# Patient Record
Sex: MALE | Race: BLACK OR AFRICAN AMERICAN | NOT HISPANIC OR LATINO | URBAN - METROPOLITAN AREA
[De-identification: names, ages, dates, MRNs, and addresses within clinical notes are randomized per-mention and may not be internally consistent; named-entity substitution may affect disease eponyms.]

---

## 2020-01-01 ENCOUNTER — HOSPITAL ENCOUNTER (INPATIENT)
Facility: HOSPITAL | Age: 85
LOS: 3 days | DRG: 314 | End: 2020-09-25
Attending: EMERGENCY MEDICINE | Admitting: INTERNAL MEDICINE
Payer: MEDICARE

## 2020-01-01 ENCOUNTER — LAB VISIT (OUTPATIENT)
Dept: LAB | Facility: OTHER | Age: 85
End: 2020-01-01
Payer: MEDICARE

## 2020-01-01 VITALS
HEIGHT: 68 IN | SYSTOLIC BLOOD PRESSURE: 97 MMHG | OXYGEN SATURATION: 92 % | TEMPERATURE: 97 F | BODY MASS INDEX: 19.08 KG/M2 | DIASTOLIC BLOOD PRESSURE: 55 MMHG | WEIGHT: 125.88 LBS | RESPIRATION RATE: 5 BRPM | HEART RATE: 63 BPM

## 2020-01-01 DIAGNOSIS — Z51.5 PALLIATIVE CARE ENCOUNTER: ICD-10-CM

## 2020-01-01 DIAGNOSIS — R57.9 SHOCK: ICD-10-CM

## 2020-01-01 DIAGNOSIS — Z03.818 ENCOUNTER FOR OBSERVATION FOR SUSPECTED EXPOSURE TO OTHER BIOLOGICAL AGENTS RULED OUT: ICD-10-CM

## 2020-01-01 DIAGNOSIS — R65.21 SEPTIC SHOCK: ICD-10-CM

## 2020-01-01 DIAGNOSIS — A41.9 SEPSIS: ICD-10-CM

## 2020-01-01 DIAGNOSIS — R56.9 SEIZURES COMPLICATING INFECTION: Primary | ICD-10-CM

## 2020-01-01 DIAGNOSIS — A41.9 SEPTIC SHOCK: ICD-10-CM

## 2020-01-01 DIAGNOSIS — B99.9 SEIZURES COMPLICATING INFECTION: Primary | ICD-10-CM

## 2020-01-01 DIAGNOSIS — Z71.89 GOALS OF CARE, COUNSELING/DISCUSSION: ICD-10-CM

## 2020-01-01 DIAGNOSIS — Z71.89 ADVANCED CARE PLANNING/COUNSELING DISCUSSION: ICD-10-CM

## 2020-01-01 LAB
ALBUMIN SERPL BCP-MCNC: 1.5 G/DL (ref 3.5–5.2)
ALBUMIN SERPL BCP-MCNC: 1.7 G/DL (ref 3.5–5.2)
ALBUMIN SERPL BCP-MCNC: 1.7 G/DL (ref 3.5–5.2)
ALBUMIN SERPL BCP-MCNC: 1.8 G/DL (ref 3.5–5.2)
ALLENS TEST: ABNORMAL
ALP SERPL-CCNC: 70 U/L (ref 55–135)
ALP SERPL-CCNC: 74 U/L (ref 55–135)
ALP SERPL-CCNC: 77 U/L (ref 55–135)
ALP SERPL-CCNC: 87 U/L (ref 55–135)
ALT SERPL W/O P-5'-P-CCNC: <5 U/L (ref 10–44)
ANION GAP SERPL CALC-SCNC: 11 MMOL/L (ref 8–16)
ANION GAP SERPL CALC-SCNC: 12 MMOL/L (ref 8–16)
ANION GAP SERPL CALC-SCNC: 12 MMOL/L (ref 8–16)
ANION GAP SERPL CALC-SCNC: 15 MMOL/L (ref 8–16)
ANION GAP SERPL CALC-SCNC: 8 MMOL/L (ref 8–16)
AST SERPL-CCNC: 15 U/L (ref 10–40)
AST SERPL-CCNC: 17 U/L (ref 10–40)
AST SERPL-CCNC: 17 U/L (ref 10–40)
AST SERPL-CCNC: 26 U/L (ref 10–40)
BACTERIA #/AREA URNS AUTO: ABNORMAL /HPF
BASOPHILS # BLD AUTO: 0.01 K/UL (ref 0–0.2)
BASOPHILS # BLD AUTO: 0.01 K/UL (ref 0–0.2)
BASOPHILS # BLD AUTO: 0.02 K/UL (ref 0–0.2)
BASOPHILS # BLD AUTO: 0.02 K/UL (ref 0–0.2)
BASOPHILS NFR BLD: 0.1 % (ref 0–1.9)
BASOPHILS NFR BLD: 0.1 % (ref 0–1.9)
BASOPHILS NFR BLD: 0.2 % (ref 0–1.9)
BASOPHILS NFR BLD: 0.4 % (ref 0–1.9)
BILIRUB SERPL-MCNC: 0.7 MG/DL (ref 0.1–1)
BILIRUB SERPL-MCNC: 0.8 MG/DL (ref 0.1–1)
BILIRUB SERPL-MCNC: 1 MG/DL (ref 0.1–1)
BILIRUB SERPL-MCNC: 1 MG/DL (ref 0.1–1)
BILIRUB UR QL STRIP: NEGATIVE
BUN SERPL-MCNC: 30 MG/DL (ref 8–23)
BUN SERPL-MCNC: 32 MG/DL (ref 8–23)
BUN SERPL-MCNC: 33 MG/DL (ref 8–23)
BUN SERPL-MCNC: 33 MG/DL (ref 8–23)
BUN SERPL-MCNC: 37 MG/DL (ref 8–23)
CALCIUM SERPL-MCNC: 8 MG/DL (ref 8.7–10.5)
CALCIUM SERPL-MCNC: 8.4 MG/DL (ref 8.7–10.5)
CALCIUM SERPL-MCNC: 8.5 MG/DL (ref 8.7–10.5)
CALCIUM SERPL-MCNC: 8.7 MG/DL (ref 8.7–10.5)
CALCIUM SERPL-MCNC: 8.8 MG/DL (ref 8.7–10.5)
CHLORIDE SERPL-SCNC: 110 MMOL/L (ref 95–110)
CHLORIDE SERPL-SCNC: 112 MMOL/L (ref 95–110)
CHLORIDE SERPL-SCNC: 113 MMOL/L (ref 95–110)
CLARITY UR REFRACT.AUTO: ABNORMAL
CO2 SERPL-SCNC: 17 MMOL/L (ref 23–29)
CO2 SERPL-SCNC: 18 MMOL/L (ref 23–29)
CO2 SERPL-SCNC: 18 MMOL/L (ref 23–29)
CO2 SERPL-SCNC: 20 MMOL/L (ref 23–29)
CO2 SERPL-SCNC: 23 MMOL/L (ref 23–29)
COLOR UR AUTO: YELLOW
CREAT SERPL-MCNC: 2 MG/DL (ref 0.5–1.4)
CREAT SERPL-MCNC: 2 MG/DL (ref 0.5–1.4)
CREAT SERPL-MCNC: 2.3 MG/DL (ref 0.5–1.4)
CREAT SERPL-MCNC: 2.4 MG/DL (ref 0.5–1.4)
CREAT SERPL-MCNC: 2.5 MG/DL (ref 0.5–1.4)
CTP QC/QA: YES
DELSYS: ABNORMAL
DIFFERENTIAL METHOD: ABNORMAL
EOSINOPHIL # BLD AUTO: 0.1 K/UL (ref 0–0.5)
EOSINOPHIL # BLD AUTO: 0.1 K/UL (ref 0–0.5)
EOSINOPHIL # BLD AUTO: 0.3 K/UL (ref 0–0.5)
EOSINOPHIL # BLD AUTO: 0.3 K/UL (ref 0–0.5)
EOSINOPHIL NFR BLD: 0.9 % (ref 0–8)
EOSINOPHIL NFR BLD: 1.3 % (ref 0–8)
EOSINOPHIL NFR BLD: 3.2 % (ref 0–8)
EOSINOPHIL NFR BLD: 5.8 % (ref 0–8)
ERYTHROCYTE [DISTWIDTH] IN BLOOD BY AUTOMATED COUNT: 14.6 % (ref 11.5–14.5)
ERYTHROCYTE [DISTWIDTH] IN BLOOD BY AUTOMATED COUNT: 14.7 % (ref 11.5–14.5)
ERYTHROCYTE [DISTWIDTH] IN BLOOD BY AUTOMATED COUNT: 14.7 % (ref 11.5–14.5)
ERYTHROCYTE [DISTWIDTH] IN BLOOD BY AUTOMATED COUNT: 14.9 % (ref 11.5–14.5)
EST. GFR  (AFRICAN AMERICAN): 26.3 ML/MIN/1.73 M^2
EST. GFR  (AFRICAN AMERICAN): 27.6 ML/MIN/1.73 M^2
EST. GFR  (AFRICAN AMERICAN): 29.1 ML/MIN/1.73 M^2
EST. GFR  (AFRICAN AMERICAN): 34.4 ML/MIN/1.73 M^2
EST. GFR  (AFRICAN AMERICAN): 34.4 ML/MIN/1.73 M^2
EST. GFR  (NON AFRICAN AMERICAN): 22.7 ML/MIN/1.73 M^2
EST. GFR  (NON AFRICAN AMERICAN): 23.9 ML/MIN/1.73 M^2
EST. GFR  (NON AFRICAN AMERICAN): 25.1 ML/MIN/1.73 M^2
EST. GFR  (NON AFRICAN AMERICAN): 29.8 ML/MIN/1.73 M^2
EST. GFR  (NON AFRICAN AMERICAN): 29.8 ML/MIN/1.73 M^2
ESTIMATED AVG GLUCOSE: 85 MG/DL (ref 68–131)
GLUCOSE SERPL-MCNC: 103 MG/DL (ref 70–110)
GLUCOSE SERPL-MCNC: 229 MG/DL (ref 70–110)
GLUCOSE SERPL-MCNC: 273 MG/DL (ref 70–110)
GLUCOSE SERPL-MCNC: 68 MG/DL (ref 70–110)
GLUCOSE SERPL-MCNC: 88 MG/DL (ref 70–110)
GLUCOSE UR QL STRIP: NEGATIVE
HBA1C MFR BLD HPLC: 4.6 % (ref 4–5.6)
HCO3 UR-SCNC: 14.1 MMOL/L (ref 24–28)
HCT VFR BLD AUTO: 25.4 % (ref 40–54)
HCT VFR BLD AUTO: 26.3 % (ref 40–54)
HCT VFR BLD AUTO: 26.8 % (ref 40–54)
HCT VFR BLD AUTO: 31.5 % (ref 40–54)
HGB BLD-MCNC: 7.9 G/DL (ref 14–18)
HGB BLD-MCNC: 8.2 G/DL (ref 14–18)
HGB BLD-MCNC: 8.3 G/DL (ref 14–18)
HGB BLD-MCNC: 9.9 G/DL (ref 14–18)
HGB UR QL STRIP: ABNORMAL
HYALINE CASTS UR QL AUTO: 1 /LPF
IMM GRANULOCYTES # BLD AUTO: 0.02 K/UL (ref 0–0.04)
IMM GRANULOCYTES # BLD AUTO: 0.03 K/UL (ref 0–0.04)
IMM GRANULOCYTES # BLD AUTO: 0.04 K/UL (ref 0–0.04)
IMM GRANULOCYTES # BLD AUTO: 0.04 K/UL (ref 0–0.04)
IMM GRANULOCYTES NFR BLD AUTO: 0.3 % (ref 0–0.5)
IMM GRANULOCYTES NFR BLD AUTO: 0.3 % (ref 0–0.5)
IMM GRANULOCYTES NFR BLD AUTO: 0.4 % (ref 0–0.5)
IMM GRANULOCYTES NFR BLD AUTO: 0.5 % (ref 0–0.5)
KETONES UR QL STRIP: ABNORMAL
LACTATE SERPL-SCNC: 0.8 MMOL/L (ref 0.5–2.2)
LACTATE SERPL-SCNC: 1.1 MMOL/L (ref 0.5–2.2)
LACTATE SERPL-SCNC: 3.7 MMOL/L (ref 0.5–2.2)
LEUKOCYTE ESTERASE UR QL STRIP: NEGATIVE
LYMPHOCYTES # BLD AUTO: 0.4 K/UL (ref 1–4.8)
LYMPHOCYTES # BLD AUTO: 0.6 K/UL (ref 1–4.8)
LYMPHOCYTES # BLD AUTO: 0.7 K/UL (ref 1–4.8)
LYMPHOCYTES # BLD AUTO: 0.7 K/UL (ref 1–4.8)
LYMPHOCYTES NFR BLD: 12.5 % (ref 18–48)
LYMPHOCYTES NFR BLD: 3.1 % (ref 18–48)
LYMPHOCYTES NFR BLD: 6.4 % (ref 18–48)
LYMPHOCYTES NFR BLD: 8.4 % (ref 18–48)
MAGNESIUM SERPL-MCNC: 1.9 MG/DL (ref 1.6–2.6)
MAGNESIUM SERPL-MCNC: 2 MG/DL (ref 1.6–2.6)
MAGNESIUM SERPL-MCNC: 2.1 MG/DL (ref 1.6–2.6)
MCH RBC QN AUTO: 31.9 PG (ref 27–31)
MCH RBC QN AUTO: 32 PG (ref 27–31)
MCH RBC QN AUTO: 32.5 PG (ref 27–31)
MCH RBC QN AUTO: 32.7 PG (ref 27–31)
MCHC RBC AUTO-ENTMCNC: 30.6 G/DL (ref 32–36)
MCHC RBC AUTO-ENTMCNC: 31.1 G/DL (ref 32–36)
MCHC RBC AUTO-ENTMCNC: 31.4 G/DL (ref 32–36)
MCHC RBC AUTO-ENTMCNC: 31.6 G/DL (ref 32–36)
MCV RBC AUTO: 102 FL (ref 82–98)
MCV RBC AUTO: 104 FL (ref 82–98)
MCV RBC AUTO: 104 FL (ref 82–98)
MCV RBC AUTO: 105 FL (ref 82–98)
MICROSCOPIC COMMENT: ABNORMAL
MONOCYTES # BLD AUTO: 0.3 K/UL (ref 0.3–1)
MONOCYTES # BLD AUTO: 0.4 K/UL (ref 0.3–1)
MONOCYTES # BLD AUTO: 0.5 K/UL (ref 0.3–1)
MONOCYTES # BLD AUTO: 0.5 K/UL (ref 0.3–1)
MONOCYTES NFR BLD: 3.1 % (ref 4–15)
MONOCYTES NFR BLD: 3.8 % (ref 4–15)
MONOCYTES NFR BLD: 6.5 % (ref 4–15)
MONOCYTES NFR BLD: 6.5 % (ref 4–15)
NEUTROPHILS # BLD AUTO: 11.4 K/UL (ref 1.8–7.7)
NEUTROPHILS # BLD AUTO: 4.1 K/UL (ref 1.8–7.7)
NEUTROPHILS # BLD AUTO: 6.4 K/UL (ref 1.8–7.7)
NEUTROPHILS # BLD AUTO: 7.6 K/UL (ref 1.8–7.7)
NEUTROPHILS NFR BLD: 74.4 % (ref 38–73)
NEUTROPHILS NFR BLD: 81.3 % (ref 38–73)
NEUTROPHILS NFR BLD: 88.8 % (ref 38–73)
NEUTROPHILS NFR BLD: 91.7 % (ref 38–73)
NITRITE UR QL STRIP: NEGATIVE
NRBC BLD-RTO: 0 /100 WBC
PCO2 BLDA: 22.3 MMHG (ref 35–45)
PH SMN: 7.41 [PH] (ref 7.35–7.45)
PH UR STRIP: 5 [PH] (ref 5–8)
PHOSPHATE SERPL-MCNC: 3.1 MG/DL (ref 2.7–4.5)
PLATELET # BLD AUTO: 217 K/UL (ref 150–350)
PLATELET # BLD AUTO: 265 K/UL (ref 150–350)
PLATELET # BLD AUTO: 277 K/UL (ref 150–350)
PLATELET # BLD AUTO: 364 K/UL (ref 150–350)
PMV BLD AUTO: 10 FL (ref 9.2–12.9)
PMV BLD AUTO: 10.1 FL (ref 9.2–12.9)
PMV BLD AUTO: 10.3 FL (ref 9.2–12.9)
PMV BLD AUTO: 10.3 FL (ref 9.2–12.9)
PO2 BLDA: 93 MMHG (ref 80–100)
POC BE: -11 MMOL/L
POC SATURATED O2: 98 % (ref 95–100)
POC TCO2: 15 MMOL/L (ref 23–27)
POCT GLUCOSE: 105 MG/DL (ref 70–110)
POCT GLUCOSE: 123 MG/DL (ref 70–110)
POCT GLUCOSE: 133 MG/DL (ref 70–110)
POCT GLUCOSE: 155 MG/DL (ref 70–110)
POCT GLUCOSE: 86 MG/DL (ref 70–110)
POCT GLUCOSE: 93 MG/DL (ref 70–110)
POCT GLUCOSE: 97 MG/DL (ref 70–110)
POTASSIUM SERPL-SCNC: 2.4 MMOL/L (ref 3.5–5.1)
POTASSIUM SERPL-SCNC: 2.9 MMOL/L (ref 3.5–5.1)
POTASSIUM SERPL-SCNC: 3.1 MMOL/L (ref 3.5–5.1)
POTASSIUM SERPL-SCNC: 3.2 MMOL/L (ref 3.5–5.1)
POTASSIUM SERPL-SCNC: 3.6 MMOL/L (ref 3.5–5.1)
POTASSIUM SERPL-SCNC: 3.6 MMOL/L (ref 3.5–5.1)
PROT SERPL-MCNC: 6.3 G/DL (ref 6–8.4)
PROT SERPL-MCNC: 6.3 G/DL (ref 6–8.4)
PROT SERPL-MCNC: 6.5 G/DL (ref 6–8.4)
PROT SERPL-MCNC: 7 G/DL (ref 6–8.4)
PROT UR QL STRIP: ABNORMAL
RBC # BLD AUTO: 2.43 M/UL (ref 4.6–6.2)
RBC # BLD AUTO: 2.54 M/UL (ref 4.6–6.2)
RBC # BLD AUTO: 2.57 M/UL (ref 4.6–6.2)
RBC # BLD AUTO: 3.09 M/UL (ref 4.6–6.2)
RBC #/AREA URNS AUTO: 8 /HPF (ref 0–4)
SAMPLE: ABNORMAL
SARS-COV-2 RDRP RESP QL NAA+PROBE: NEGATIVE
SARS-COV-2 RNA RESP QL NAA+PROBE: NOT DETECTED
SITE: ABNORMAL
SODIUM SERPL-SCNC: 139 MMOL/L (ref 136–145)
SODIUM SERPL-SCNC: 139 MMOL/L (ref 136–145)
SODIUM SERPL-SCNC: 140 MMOL/L (ref 136–145)
SODIUM SERPL-SCNC: 143 MMOL/L (ref 136–145)
SODIUM SERPL-SCNC: 148 MMOL/L (ref 136–145)
SP GR UR STRIP: 1.01 (ref 1–1.03)
T4 FREE SERPL-MCNC: 0.92 NG/DL (ref 0.71–1.51)
TROPONIN I SERPL DL<=0.01 NG/ML-MCNC: 0.03 NG/ML (ref 0–0.03)
TROPONIN I SERPL DL<=0.01 NG/ML-MCNC: 0.04 NG/ML (ref 0–0.03)
TROPONIN I SERPL DL<=0.01 NG/ML-MCNC: 0.05 NG/ML (ref 0–0.03)
TROPONIN I SERPL DL<=0.01 NG/ML-MCNC: 0.07 NG/ML (ref 0–0.03)
TROPONIN I SERPL DL<=0.01 NG/ML-MCNC: 0.1 NG/ML (ref 0–0.03)
TROPONIN I SERPL DL<=0.01 NG/ML-MCNC: 0.12 NG/ML (ref 0–0.03)
TROPONIN I SERPL DL<=0.01 NG/ML-MCNC: 0.19 NG/ML (ref 0–0.03)
TROPONIN I SERPL DL<=0.01 NG/ML-MCNC: 0.2 NG/ML (ref 0–0.03)
TROPONIN I SERPL DL<=0.01 NG/ML-MCNC: 0.2 NG/ML (ref 0–0.03)
TSH SERPL DL<=0.005 MIU/L-ACNC: 4.68 UIU/ML (ref 0.4–4)
URN SPEC COLLECT METH UR: ABNORMAL
VANCOMYCIN SERPL-MCNC: 16.8 UG/ML
VANCOMYCIN SERPL-MCNC: 22.7 UG/ML
VANCOMYCIN SERPL-MCNC: 4.6 UG/ML
WBC # BLD AUTO: 12.39 K/UL (ref 3.9–12.7)
WBC # BLD AUTO: 5.53 K/UL (ref 3.9–12.7)
WBC # BLD AUTO: 7.85 K/UL (ref 3.9–12.7)
WBC # BLD AUTO: 8.59 K/UL (ref 3.9–12.7)
WBC #/AREA URNS AUTO: 2 /HPF (ref 0–5)

## 2020-01-01 PROCEDURE — 36620 INSERTION CATHETER ARTERY: CPT

## 2020-01-01 PROCEDURE — 84484 ASSAY OF TROPONIN QUANT: CPT | Mod: 91

## 2020-01-01 PROCEDURE — 99291 PR CRITICAL CARE, E/M 30-74 MINUTES: ICD-10-PCS | Mod: ,,, | Performed by: INTERNAL MEDICINE

## 2020-01-01 PROCEDURE — 80053 COMPREHEN METABOLIC PANEL: CPT

## 2020-01-01 PROCEDURE — 99291 CRITICAL CARE FIRST HOUR: CPT | Mod: 25,,, | Performed by: EMERGENCY MEDICINE

## 2020-01-01 PROCEDURE — 25000003 PHARM REV CODE 250: Performed by: STUDENT IN AN ORGANIZED HEALTH CARE EDUCATION/TRAINING PROGRAM

## 2020-01-01 PROCEDURE — 80048 BASIC METABOLIC PNL TOTAL CA: CPT

## 2020-01-01 PROCEDURE — 99223 PR INITIAL HOSPITAL CARE,LEVL III: ICD-10-PCS | Mod: ,,, | Performed by: CLINICAL NURSE SPECIALIST

## 2020-01-01 PROCEDURE — 80202 ASSAY OF VANCOMYCIN: CPT

## 2020-01-01 PROCEDURE — 87040 BLOOD CULTURE FOR BACTERIA: CPT

## 2020-01-01 PROCEDURE — 51702 INSERT TEMP BLADDER CATH: CPT

## 2020-01-01 PROCEDURE — 99497 ADVNCD CARE PLAN 30 MIN: CPT | Mod: 25,,, | Performed by: CLINICAL NURSE SPECIALIST

## 2020-01-01 PROCEDURE — 63600175 PHARM REV CODE 636 W HCPCS: Performed by: STUDENT IN AN ORGANIZED HEALTH CARE EDUCATION/TRAINING PROGRAM

## 2020-01-01 PROCEDURE — 84132 ASSAY OF SERUM POTASSIUM: CPT

## 2020-01-01 PROCEDURE — 84484 ASSAY OF TROPONIN QUANT: CPT

## 2020-01-01 PROCEDURE — 37799 UNLISTED PX VASCULAR SURGERY: CPT

## 2020-01-01 PROCEDURE — 99291 CRITICAL CARE FIRST HOUR: CPT | Mod: 25

## 2020-01-01 PROCEDURE — 94761 N-INVAS EAR/PLS OXIMETRY MLT: CPT

## 2020-01-01 PROCEDURE — 25000003 PHARM REV CODE 250: Performed by: INTERNAL MEDICINE

## 2020-01-01 PROCEDURE — 51701 INSERT BLADDER CATHETER: CPT

## 2020-01-01 PROCEDURE — 96361 HYDRATE IV INFUSION ADD-ON: CPT

## 2020-01-01 PROCEDURE — 83036 HEMOGLOBIN GLYCOSYLATED A1C: CPT

## 2020-01-01 PROCEDURE — 84443 ASSAY THYROID STIM HORMONE: CPT

## 2020-01-01 PROCEDURE — 20000000 HC ICU ROOM

## 2020-01-01 PROCEDURE — 96375 TX/PRO/DX INJ NEW DRUG ADDON: CPT

## 2020-01-01 PROCEDURE — 87186 SC STD MICRODIL/AGAR DIL: CPT

## 2020-01-01 PROCEDURE — 81001 URINALYSIS AUTO W/SCOPE: CPT

## 2020-01-01 PROCEDURE — 87077 CULTURE AEROBIC IDENTIFY: CPT

## 2020-01-01 PROCEDURE — 63600175 PHARM REV CODE 636 W HCPCS: Performed by: EMERGENCY MEDICINE

## 2020-01-01 PROCEDURE — 99233 PR SUBSEQUENT HOSPITAL CARE,LEVL III: ICD-10-PCS | Mod: ,,, | Performed by: INTERNAL MEDICINE

## 2020-01-01 PROCEDURE — 96365 THER/PROPH/DIAG IV INF INIT: CPT

## 2020-01-01 PROCEDURE — 99291 CRITICAL CARE FIRST HOUR: CPT | Mod: ,,, | Performed by: INTERNAL MEDICINE

## 2020-01-01 PROCEDURE — 87186 SC STD MICRODIL/AGAR DIL: CPT | Mod: 91

## 2020-01-01 PROCEDURE — 84100 ASSAY OF PHOSPHORUS: CPT

## 2020-01-01 PROCEDURE — 63600175 PHARM REV CODE 636 W HCPCS

## 2020-01-01 PROCEDURE — 99233 SBSQ HOSP IP/OBS HIGH 50: CPT | Mod: ,,, | Performed by: INTERNAL MEDICINE

## 2020-01-01 PROCEDURE — 63600175 PHARM REV CODE 636 W HCPCS: Performed by: INTERNAL MEDICINE

## 2020-01-01 PROCEDURE — S0166 INJ OLANZAPINE 2.5MG: HCPCS | Performed by: STUDENT IN AN ORGANIZED HEALTH CARE EDUCATION/TRAINING PROGRAM

## 2020-01-01 PROCEDURE — 99900035 HC TECH TIME PER 15 MIN (STAT)

## 2020-01-01 PROCEDURE — 83735 ASSAY OF MAGNESIUM: CPT

## 2020-01-01 PROCEDURE — 93010 ELECTROCARDIOGRAM REPORT: CPT | Mod: ,,, | Performed by: INTERNAL MEDICINE

## 2020-01-01 PROCEDURE — 96366 THER/PROPH/DIAG IV INF ADDON: CPT

## 2020-01-01 PROCEDURE — 99223 1ST HOSP IP/OBS HIGH 75: CPT | Mod: ,,, | Performed by: CLINICAL NURSE SPECIALIST

## 2020-01-01 PROCEDURE — 63600175 PHARM REV CODE 636 W HCPCS: Performed by: PHYSICIAN ASSISTANT

## 2020-01-01 PROCEDURE — 96374 THER/PROPH/DIAG INJ IV PUSH: CPT | Mod: 59

## 2020-01-01 PROCEDURE — U0002 COVID-19 LAB TEST NON-CDC: HCPCS | Performed by: EMERGENCY MEDICINE

## 2020-01-01 PROCEDURE — 84439 ASSAY OF FREE THYROXINE: CPT

## 2020-01-01 PROCEDURE — 36556 INSERT NON-TUNNEL CV CATH: CPT

## 2020-01-01 PROCEDURE — 99292 PR CRITICAL CARE, ADDL 30 MIN: ICD-10-PCS | Mod: ,,, | Performed by: EMERGENCY MEDICINE

## 2020-01-01 PROCEDURE — 83605 ASSAY OF LACTIC ACID: CPT | Mod: 91

## 2020-01-01 PROCEDURE — 85025 COMPLETE CBC W/AUTO DIFF WBC: CPT

## 2020-01-01 PROCEDURE — 93005 ELECTROCARDIOGRAM TRACING: CPT

## 2020-01-01 PROCEDURE — U0003 INFECTIOUS AGENT DETECTION BY NUCLEIC ACID (DNA OR RNA); SEVERE ACUTE RESPIRATORY SYNDROME CORONAVIRUS 2 (SARS-COV-2) (CORONAVIRUS DISEASE [COVID-19]), AMPLIFIED PROBE TECHNIQUE, MAKING USE OF HIGH THROUGHPUT TECHNOLOGIES AS DESCRIBED BY CMS-2020-01-R: HCPCS

## 2020-01-01 PROCEDURE — 83605 ASSAY OF LACTIC ACID: CPT

## 2020-01-01 PROCEDURE — 25000003 PHARM REV CODE 250

## 2020-01-01 PROCEDURE — 82803 BLOOD GASES ANY COMBINATION: CPT

## 2020-01-01 PROCEDURE — 25000003 PHARM REV CODE 250: Performed by: EMERGENCY MEDICINE

## 2020-01-01 PROCEDURE — 87106 FUNGI IDENTIFICATION YEAST: CPT

## 2020-01-01 PROCEDURE — 27200188 HC TRANSDUCER, ART ADULT/PEDS

## 2020-01-01 PROCEDURE — 93010 EKG 12-LEAD: ICD-10-PCS | Mod: ,,, | Performed by: INTERNAL MEDICINE

## 2020-01-01 PROCEDURE — 99292 CRITICAL CARE ADDL 30 MIN: CPT | Mod: ,,, | Performed by: EMERGENCY MEDICINE

## 2020-01-01 PROCEDURE — 99291 PR CRITICAL CARE, E/M 30-74 MINUTES: ICD-10-PCS | Mod: 25,,, | Performed by: EMERGENCY MEDICINE

## 2020-01-01 PROCEDURE — 99292 CRITICAL CARE ADDL 30 MIN: CPT

## 2020-01-01 PROCEDURE — C1752 CATH,HEMODIALYSIS,SHORT-TERM: HCPCS

## 2020-01-01 PROCEDURE — 99497 PR ADVNCD CARE PLAN 30 MIN: ICD-10-PCS | Mod: 25,,, | Performed by: CLINICAL NURSE SPECIALIST

## 2020-01-01 PROCEDURE — 96367 TX/PROPH/DG ADDL SEQ IV INF: CPT

## 2020-01-01 RX ORDER — CEFEPIME HYDROCHLORIDE 2 G/1
2 INJECTION, POWDER, FOR SOLUTION INTRAVENOUS
Status: COMPLETED | OUTPATIENT
Start: 2020-01-01 | End: 2020-01-01

## 2020-01-01 RX ORDER — LEVETIRACETAM 10 MG/ML
1000 INJECTION INTRAVASCULAR
Status: COMPLETED | OUTPATIENT
Start: 2020-01-01 | End: 2020-01-01

## 2020-01-01 RX ORDER — POTASSIUM CHLORIDE 7.45 MG/ML
10 INJECTION INTRAVENOUS
Status: COMPLETED | OUTPATIENT
Start: 2020-01-01 | End: 2020-01-01

## 2020-01-01 RX ORDER — HALOPERIDOL 5 MG/ML
2 INJECTION INTRAMUSCULAR ONCE
Status: DISCONTINUED | OUTPATIENT
Start: 2020-01-01 | End: 2020-01-01

## 2020-01-01 RX ORDER — LORAZEPAM 2 MG/ML
2 INJECTION INTRAMUSCULAR EVERY 30 MIN PRN
Status: DISCONTINUED | OUTPATIENT
Start: 2020-01-01 | End: 2020-01-01 | Stop reason: HOSPADM

## 2020-01-01 RX ORDER — CEFEPIME HYDROCHLORIDE 1 G/1
1 INJECTION, POWDER, FOR SOLUTION INTRAMUSCULAR; INTRAVENOUS
Status: DISCONTINUED | OUTPATIENT
Start: 2020-01-01 | End: 2020-01-01

## 2020-01-01 RX ORDER — NOREPINEPHRINE BITARTRATE/D5W 4MG/250ML
0.02 PLASTIC BAG, INJECTION (ML) INTRAVENOUS CONTINUOUS
Status: DISCONTINUED | OUTPATIENT
Start: 2020-01-01 | End: 2020-01-01

## 2020-01-01 RX ORDER — CEFEPIME HYDROCHLORIDE 2 G/1
2 INJECTION, POWDER, FOR SOLUTION INTRAVENOUS
Status: DISCONTINUED | OUTPATIENT
Start: 2020-01-01 | End: 2020-01-01

## 2020-01-01 RX ORDER — LEVETIRACETAM 5 MG/ML
500 INJECTION INTRAVASCULAR EVERY 12 HOURS
Status: DISCONTINUED | OUTPATIENT
Start: 2020-01-01 | End: 2020-01-01

## 2020-01-01 RX ORDER — POTASSIUM CHLORIDE 14.9 MG/ML
20 INJECTION INTRAVENOUS ONCE
Status: COMPLETED | OUTPATIENT
Start: 2020-01-01 | End: 2020-01-01

## 2020-01-01 RX ORDER — LORAZEPAM 2 MG/ML
INJECTION INTRAMUSCULAR
Status: DISPENSED
Start: 2020-01-01 | End: 2020-01-01

## 2020-01-01 RX ORDER — POTASSIUM CHLORIDE 7.45 MG/ML
INJECTION INTRAVENOUS
Status: COMPLETED
Start: 2020-01-01 | End: 2020-01-01

## 2020-01-01 RX ORDER — LORAZEPAM 2 MG/ML
INJECTION INTRAMUSCULAR
Status: COMPLETED
Start: 2020-01-01 | End: 2020-01-01

## 2020-01-01 RX ORDER — INSULIN ASPART 100 [IU]/ML
0-5 INJECTION, SOLUTION INTRAVENOUS; SUBCUTANEOUS EVERY 6 HOURS PRN
Status: DISCONTINUED | OUTPATIENT
Start: 2020-01-01 | End: 2020-01-01

## 2020-01-01 RX ORDER — MORPHINE SULFATE 2 MG/ML
10 INJECTION, SOLUTION INTRAMUSCULAR; INTRAVENOUS EVERY 5 MIN PRN
Status: DISCONTINUED | OUTPATIENT
Start: 2020-01-01 | End: 2020-01-01 | Stop reason: HOSPADM

## 2020-01-01 RX ORDER — PHENYLEPHRINE HCL IN 0.9% NACL 1 MG/10 ML
SYRINGE (ML) INTRAVENOUS
Status: DISPENSED
Start: 2020-01-01 | End: 2020-01-01

## 2020-01-01 RX ORDER — MORPHINE SULFATE 1 MG/ML
1 INJECTION, SOLUTION INTRAVENOUS CONTINUOUS
Status: DISCONTINUED | OUTPATIENT
Start: 2020-01-01 | End: 2020-01-01 | Stop reason: HOSPADM

## 2020-01-01 RX ORDER — LEVETIRACETAM 10 MG/ML
1000 INJECTION INTRAVASCULAR
Status: DISCONTINUED | OUTPATIENT
Start: 2020-01-01 | End: 2020-01-01

## 2020-01-01 RX ORDER — LEVETIRACETAM 10 MG/ML
INJECTION INTRAVASCULAR
Status: DISPENSED
Start: 2020-01-01 | End: 2020-01-01

## 2020-01-01 RX ORDER — LORAZEPAM 2 MG/ML
1 INJECTION INTRAMUSCULAR
Status: COMPLETED | OUTPATIENT
Start: 2020-01-01 | End: 2020-01-01

## 2020-01-01 RX ORDER — MAGNESIUM SULFATE HEPTAHYDRATE 40 MG/ML
INJECTION, SOLUTION INTRAVENOUS
Status: COMPLETED
Start: 2020-01-01 | End: 2020-01-01

## 2020-01-01 RX ORDER — LORAZEPAM 2 MG/ML
1 INJECTION INTRAMUSCULAR ONCE
Status: COMPLETED | OUTPATIENT
Start: 2020-01-01 | End: 2020-01-01

## 2020-01-01 RX ORDER — MAGNESIUM SULFATE HEPTAHYDRATE 40 MG/ML
2 INJECTION, SOLUTION INTRAVENOUS
Status: COMPLETED | OUTPATIENT
Start: 2020-01-01 | End: 2020-01-01

## 2020-01-01 RX ORDER — NOREPINEPHRINE BITARTRATE/D5W 4MG/250ML
PLASTIC BAG, INJECTION (ML) INTRAVENOUS
Status: COMPLETED
Start: 2020-01-01 | End: 2020-01-01

## 2020-01-01 RX ORDER — POTASSIUM CHLORIDE 14.9 MG/ML
INJECTION INTRAVENOUS
Status: DISCONTINUED
Start: 2020-01-01 | End: 2020-01-01 | Stop reason: WASHOUT

## 2020-01-01 RX ORDER — OLANZAPINE 10 MG/2ML
2.5 INJECTION, POWDER, FOR SOLUTION INTRAMUSCULAR ONCE AS NEEDED
Status: COMPLETED | OUTPATIENT
Start: 2020-01-01 | End: 2020-01-01

## 2020-01-01 RX ORDER — ATROPINE SULFATE 0.1 MG/ML
INJECTION INTRAVENOUS CODE/TRAUMA/SEDATION MEDICATION
Status: COMPLETED | OUTPATIENT
Start: 2020-01-01 | End: 2020-01-01

## 2020-01-01 RX ORDER — LORAZEPAM 2 MG/ML
2 INJECTION INTRAMUSCULAR ONCE
Status: COMPLETED | OUTPATIENT
Start: 2020-01-01 | End: 2020-01-01

## 2020-01-01 RX ORDER — GLUCAGON 1 MG
1 KIT INJECTION
Status: DISCONTINUED | OUTPATIENT
Start: 2020-01-01 | End: 2020-01-01

## 2020-01-01 RX ADMIN — POTASSIUM CHLORIDE 10 MEQ: 7.46 INJECTION, SOLUTION INTRAVENOUS at 12:09

## 2020-01-01 RX ADMIN — LEVETIRACETAM INJECTION 1000 MG: 10 INJECTION INTRAVENOUS at 10:09

## 2020-01-01 RX ADMIN — LORAZEPAM 2 MG: 2 INJECTION INTRAMUSCULAR at 12:09

## 2020-01-01 RX ADMIN — Medication 0.2 MCG/KG/MIN: at 11:09

## 2020-01-01 RX ADMIN — LEVETIRACETAM 500 MG: 5 INJECTION INTRAVENOUS at 02:09

## 2020-01-01 RX ADMIN — Medication 0.66 MCG/KG/MIN: at 09:09

## 2020-01-01 RX ADMIN — NOREPINEPHRINE BITARTRATE 0.74 MCG/KG/MIN: 1 INJECTION, SOLUTION, CONCENTRATE INTRAVENOUS at 09:09

## 2020-01-01 RX ADMIN — POTASSIUM CHLORIDE 20 MEQ: 14.9 INJECTION, SOLUTION INTRAVENOUS at 10:09

## 2020-01-01 RX ADMIN — VANCOMYCIN HYDROCHLORIDE 2000 MG: 100 INJECTION, POWDER, LYOPHILIZED, FOR SOLUTION INTRAVENOUS at 10:09

## 2020-01-01 RX ADMIN — EPINEPHRINE 0.1 MCG/KG/MIN: 1 INJECTION PARENTERAL at 03:09

## 2020-01-01 RX ADMIN — ATROPINE SULFATE 0.5 MG: 0.1 INJECTION PARENTERAL at 11:09

## 2020-01-01 RX ADMIN — NOREPINEPHRINE BITARTRATE 0.84 MCG/KG/MIN: 1 INJECTION, SOLUTION, CONCENTRATE INTRAVENOUS at 04:09

## 2020-01-01 RX ADMIN — OLANZAPINE 2.5 MG: 10 INJECTION, POWDER, FOR SOLUTION INTRAMUSCULAR at 05:09

## 2020-01-01 RX ADMIN — POTASSIUM CHLORIDE 10 MEQ: 7.46 INJECTION, SOLUTION INTRAVENOUS at 04:09

## 2020-01-01 RX ADMIN — LEVETIRACETAM 500 MG: 5 INJECTION INTRAVENOUS at 08:09

## 2020-01-01 RX ADMIN — LORAZEPAM 1 MG: 2 INJECTION INTRAMUSCULAR; INTRAVENOUS at 05:09

## 2020-01-01 RX ADMIN — POTASSIUM CHLORIDE 10 MEQ: 7.46 INJECTION, SOLUTION INTRAVENOUS at 11:09

## 2020-01-01 RX ADMIN — POTASSIUM CHLORIDE 10 MEQ: 7.46 INJECTION, SOLUTION INTRAVENOUS at 08:09

## 2020-01-01 RX ADMIN — EPINEPHRINE 0.02 MCG/KG/MIN: 1 INJECTION PARENTERAL at 11:09

## 2020-01-01 RX ADMIN — NOREPINEPHRINE BITARTRATE 0.88 MCG/KG/MIN: 1 INJECTION, SOLUTION, CONCENTRATE INTRAVENOUS at 11:09

## 2020-01-01 RX ADMIN — POTASSIUM CHLORIDE 10 MEQ: 7.46 INJECTION, SOLUTION INTRAVENOUS at 10:09

## 2020-01-01 RX ADMIN — POTASSIUM CHLORIDE 10 MEQ: 7.46 INJECTION, SOLUTION INTRAVENOUS at 02:09

## 2020-01-01 RX ADMIN — POTASSIUM CHLORIDE 10 MEQ: 10 INJECTION, SOLUTION INTRAVENOUS at 07:09

## 2020-01-01 RX ADMIN — EPINEPHRINE 0.16 MCG/KG/MIN: 1 INJECTION PARENTERAL at 12:09

## 2020-01-01 RX ADMIN — EPINEPHRINE 0.1 MCG/KG/MIN: 1 INJECTION PARENTERAL at 02:09

## 2020-01-01 RX ADMIN — POTASSIUM CHLORIDE 10 MEQ: 7.45 INJECTION INTRAVENOUS at 11:09

## 2020-01-01 RX ADMIN — NOREPINEPHRINE BITARTRATE 0.62 MCG/KG/MIN: 1 INJECTION, SOLUTION, CONCENTRATE INTRAVENOUS at 02:09

## 2020-01-01 RX ADMIN — MAGNESIUM SULFATE IN WATER 2 G: 40 INJECTION, SOLUTION INTRAVENOUS at 11:09

## 2020-01-01 RX ADMIN — CEFEPIME 2 G: 2 INJECTION, POWDER, FOR SOLUTION INTRAVENOUS at 09:09

## 2020-01-01 RX ADMIN — VANCOMYCIN HYDROCHLORIDE 750 MG: 750 INJECTION, POWDER, LYOPHILIZED, FOR SOLUTION INTRAVENOUS at 10:09

## 2020-01-01 RX ADMIN — LEVETIRACETAM 500 MG: 5 INJECTION INTRAVENOUS at 09:09

## 2020-01-01 RX ADMIN — EPINEPHRINE 0.15 MCG/KG/MIN: 1 INJECTION PARENTERAL at 09:09

## 2020-01-01 RX ADMIN — CEFEPIME 2 G: 2 INJECTION, POWDER, FOR SOLUTION INTRAVENOUS at 10:09

## 2020-01-01 RX ADMIN — CEFEPIME 1 G: 1 INJECTION, POWDER, FOR SOLUTION INTRAMUSCULAR; INTRAVENOUS at 10:09

## 2020-01-01 RX ADMIN — EPINEPHRINE 0.15 MCG/KG/MIN: 1 INJECTION PARENTERAL at 06:09

## 2020-01-01 RX ADMIN — LORAZEPAM 2 MG: 2 INJECTION INTRAMUSCULAR; INTRAVENOUS at 12:09

## 2020-01-01 RX ADMIN — Medication 0.7 MCG/KG/MIN: at 07:09

## 2020-01-01 RX ADMIN — MAGNESIUM SULFATE HEPTAHYDRATE 2 G: 40 INJECTION, SOLUTION INTRAVENOUS at 11:09

## 2020-01-01 RX ADMIN — EPINEPHRINE 0.08 MCG/KG/MIN: 1 INJECTION PARENTERAL at 09:09

## 2020-01-01 RX ADMIN — VANCOMYCIN HYDROCHLORIDE 1750 MG: 750 INJECTION, POWDER, LYOPHILIZED, FOR SOLUTION INTRAVENOUS at 08:09

## 2020-01-01 RX ADMIN — NOREPINEPHRINE BITARTRATE 0.84 MCG/KG/MIN: 1 INJECTION, SOLUTION, CONCENTRATE INTRAVENOUS at 09:09

## 2020-01-01 RX ADMIN — SODIUM CHLORIDE, SODIUM LACTATE, POTASSIUM CHLORIDE, AND CALCIUM CHLORIDE 500 ML: .6; .31; .03; .02 INJECTION, SOLUTION INTRAVENOUS at 03:09

## 2020-01-01 RX ADMIN — NOREPINEPHRINE BITARTRATE 0.7 MCG/KG/MIN: 1 INJECTION, SOLUTION, CONCENTRATE INTRAVENOUS at 09:09

## 2020-01-01 RX ADMIN — POTASSIUM CHLORIDE 10 MEQ: 10 INJECTION, SOLUTION INTRAVENOUS at 05:09

## 2020-01-01 RX ADMIN — LORAZEPAM 1 MG: 2 INJECTION INTRAMUSCULAR; INTRAVENOUS at 10:09

## 2020-01-01 RX ADMIN — POTASSIUM CHLORIDE 10 MEQ: 7.46 INJECTION, SOLUTION INTRAVENOUS at 03:09

## 2020-01-01 RX ADMIN — SODIUM CHLORIDE 1000 ML: 0.9 INJECTION, SOLUTION INTRAVENOUS at 09:09

## 2020-01-01 RX ADMIN — Medication 1 MG/HR: at 01:09

## 2020-01-01 RX ADMIN — POTASSIUM CHLORIDE 10 MEQ: 7.46 INJECTION, SOLUTION INTRAVENOUS at 01:09

## 2020-01-01 RX ADMIN — POTASSIUM CHLORIDE 10 MEQ: 10 INJECTION, SOLUTION INTRAVENOUS at 06:09

## 2020-01-01 RX ADMIN — POTASSIUM CHLORIDE 10 MEQ: 7.46 INJECTION, SOLUTION INTRAVENOUS at 07:09

## 2020-01-01 RX ADMIN — POTASSIUM CHLORIDE 10 MEQ: 7.46 INJECTION, SOLUTION INTRAVENOUS at 09:09

## 2020-09-22 PROBLEM — I50.22 CHRONIC SYSTOLIC HEART FAILURE: Status: ACTIVE | Noted: 2020-01-01

## 2020-09-22 PROBLEM — A41.9 SEPTIC SHOCK: Status: ACTIVE | Noted: 2020-01-01

## 2020-09-22 PROBLEM — B99.9 SEIZURES COMPLICATING INFECTION: Status: ACTIVE | Noted: 2020-01-01

## 2020-09-22 PROBLEM — R00.1 BRADYCARDIA: Status: ACTIVE | Noted: 2020-01-01

## 2020-09-22 PROBLEM — R56.9 SEIZURES COMPLICATING INFECTION: Status: ACTIVE | Noted: 2020-01-01

## 2020-09-22 PROBLEM — R65.21 SEPTIC SHOCK: Status: ACTIVE | Noted: 2020-01-01

## 2020-09-22 NOTE — HPI
Mr. Delvalle is an 85 year old year old male with a PMH of HFrEF (EF 30-35%) and recent MSSA bacteremia (with MRSA in the urine) who presents from [facility] for altered mental status. Per his wife Katia, patient was fairly functional with his ADLs prior to this year. In January, he was hospitalized for pneumonia and never fully recovered. Recently he was hospitalized in August for a CHF exacerbation, then re-admitted to [place] 8/31-9/11 for bacteremia. He was ultimately discharged to SNF with ancef through a midline for a 14 day course of antibiotics. Today, the SNF noted him to have seizure like activity and he was brought to the ED. He was noted to be quite obtunded and had further seizure like activity in the ED. An a line was started and he was started on pressors with norepi at 0.5 and epi at 0.02, but the decision was made not to intubate as per his wife's wishes.     His wife Katia expressed very good understanding of the situation and understands that he is dying. She stated that his body has been through a lot and she does not want to prolong his suffering and she does not want any more invasive procedures done to his body. She does not want him to be put on a ventilator, and does not want him to receive chest compressions.     She is calling his brother and her children to see if anyone would like to come to bedside. If he further decompensates prior to this, she does not want any escalation of care. Once other family members come, she would like to transition goals to comfort care.

## 2020-09-22 NOTE — ED TRIAGE NOTES
"Pt. Brought in via EMS from [facility]. Per the EMS report, the pt. Was found having "seizures" in the nursing home. He also was recently discharged from another facility after we treated for Sepsis.   "

## 2020-09-22 NOTE — PROGRESS NOTES
Pharmacokinetic Initial Assessment & Plan: IV Vancomycin    Intravenous vancomycin 2000 mg was initiated in the ED @ 1021 on 09/22.    Subsequent doses based on random concentrations.  Draw vancomycin random level on 09/23 at 0600 with am labs .Desired empiric serum trough concentration is 15 to 20 mcg/mL.    Pharmacy will continue to follow and monitor vancomycin.    x [phone] with any questions regarding this assessment.     Thank you for the consult,   Malina Torres       Patient brief summary:  Eric Delvalle is a 85 y.o. male initiated on antimicrobial therapy with IV Vancomycin for treatment of suspected bacteremia    Drug Allergies:   Review of patient's allergies indicates:  No Known Allergies    Actual Body Weight:   50 kg     Renal Function:   Estimated Creatinine Clearance: 19.4 mL/min (A) (based on SCr of 2 mg/dL (H)).,     CBC (last 72 hours):  Recent Labs   Lab Result Units 09/22/20  0950   WBC K/uL 8.59   Hemoglobin g/dL 7.9*   Hematocrit % 25.4*   Platelets K/uL 277   Gran% % 88.8*   Lymph% % 6.4*   Mono% % 3.1*   Eosinophil% % 1.3   Basophil% % 0.1   Differential Method  Automated       Metabolic Panel (last 72 hours):  Recent Labs   Lab Result Units 09/22/20  0950 09/22/20  1215   Sodium mmol/L 148*  --    Potassium mmol/L 2.9*  --    Chloride mmol/L 113*  --    CO2 mmol/L 23  --    Glucose mg/dL 68*  --    Glucose, UA   --  Negative   BUN, Bld mg/dL 30*  --    Creatinine mg/dL 2.0*  --    Albumin g/dL 1.7*  --    Total Bilirubin mg/dL 0.7  --    Alkaline Phosphatase U/L 70  --    AST U/L 26  --    ALT U/L <5*  --        Drug levels (last 3 results):  No results for input(s): VANCOMYCINRA, VANCOMYCINPE, VANCOMYCINTR in the last 72 hours.    Microbiologic Results:  Microbiology Results (last 7 days)     Procedure Component Value Units Date/Time    Culture, Respiratory with Gram Stain [[id number]]     Order Status: No result Specimen: Respiratory     Blood culture x two cultures. Draw prior to  antibiotics. [[id number]] Collected: 09/22/20 0950    Order Status: Completed Specimen: Blood from Peripheral, Hand, Right Updated: 09/22/20 1715     Blood Culture, Routine No Growth to date    Narrative:      Aerobic and anaerobic    Blood culture x two cultures. Draw prior to antibiotics. [[id number]] Collected: 09/22/20 0951    Order Status: Completed Specimen: Blood from Midline, Cephalic, Left Updated: 09/22/20 1715     Blood Culture, Routine No Growth to date    Narrative:      Aerobic and anaerobic

## 2020-09-22 NOTE — ED NOTES
"Eric Delvalle, a 85 y.o. male presents to the ED via EMS from [facility] with CC per the EMS report, the nursing facility called EMS due to finding the pt. Having a "seizure."   Pt. Was also recently d/c from another facility after being treated for sepsis.     Patient identifiers verified verbally with patient and correct for Eric Delvalle.    LOC/ APPEARANCE: The patient is arousable to painful stimulation.  Pt is not speaking appropriately, all pt. Will say is "ow."  Pt changed into hospital gown. Continuous cardiac monitor, cont pulse ox, and auto BP cuff applied to patient. Pt is clean and well groomed. No JVD visible.  Pt updated on POC. Bed low and locked with side rails up x2, call bell in pt reach.  SKIN: Skin is warm dry, and color is consistent with ethnicity. Capillary refill <3 seconds. No brusing visible. Mucus membranes moist, acyanotic. Pt. Has stage 2 pressure ulcer on his sacrum. Dressing is clean, dry, and intact. Pt. Has necrotic wound on the bottom of his left heel, dressing is clean, dry, and intact.   RESPIRATORY: Airway is open and patent. Respirations-spontaneous, unlabored, regular rate, equal bilaterally on inspiration and expiration. No accessory muscle use noted. Crackles heard in bilateral lungs.  CARDIAC: Patient has regular heart rate.  No peripheral edema noted, and patient has no c/o chest pain. Peripheral pulses present equal and strong throughout.  ABDOMEN: Soft and non-tender to palpation with no distention noted. Normoactive bowel sounds x4 quadrants. Pt has no complaints of abnormal bowel movements, denies blood in stool.   NEUROLOGIC: Eyes open to voice and facial expression symmetrical. Pt. Follows minimal commands. Pt reports sensation present in all extremities when touched with a finger, denies any numbness or tingling. PERRLA  MUSCULOSKELETAL: Pt. Moving arms irratically.  Not following commands to assess strength.   : Pt. Is incontinent.   "

## 2020-09-22 NOTE — ED NOTES
Pt. Lying in bed, appears to being having another seizure. Pt. Not responding to voice, eyes fixed. Rhythmic twitching in bed. Dr. Antonio called to bedside.

## 2020-09-22 NOTE — PLAN OF CARE
Briefly, Mr Delavlle is an 86 yo M with a PMH of HFrEF (EF 30-35%), with multiple hospitalizations this year including most recently from 8/31-9/11 for MSSA bacteremia/MRSA in the urine, was discharged to SNF for continued IV abx. He has had significant decline over the past year, and especially in the past few months.    He was noted to be altered at [facility] with seizure like activity, he presented to the ED profoundly altered and in shock. He is currently on 0.5 of norepi and 0.02 of epi and is bradycardic to 39, MAPs around 60, and hypothermic.    His wife Katia expressed very good understanding of the situation and understands that he is dying. She stated that his body has been through a lot and she does not want to prolong his suffering and she does not want any more invasive procedures done to his body. She does not want him to be put on a ventilator, and does not want him to receive chest compressions.    She is calling his brother and her children to see if anyone would like to come to bedside. If he further decompensates prior to this, she does not want any escalation of care. Once other family members come, she would like to transition goals to comfort care.       Zunilda Nevarez MD  Pulmonary and Critical Care Fellow  09/22/2020  12:51 PM

## 2020-09-22 NOTE — ED NOTES
Pt. Became bradycardic, and hypoxic. Pulse still palpable. Dr. Antonio called to bedside. Code cart pulled to bedside and zoll pads placed on pt.

## 2020-09-22 NOTE — ED PROVIDER NOTES
Encounter Date: 9/22/2020       History     Chief Complaint   Patient presents with    Hypotension     Patient from [facility], states patient had seizure type activity that lasted 2-3 min per staff at [facility].  Pateint is pocketing food. Hx of Dementia, patient does not speake     HPI     This is an 85-year-old man with a history of dementia, CKD, diabetes and recent hospitalization at an outside facility for MRSA pneumonia, UTI, and MSSA bacteremia, for which he is currently undergoing inpatient rehab at [facility] who presents with acute onset of seizure activity.  He does not have a history of seizure disorder.  He reportedly had an episode of generalized tonic-clonic convulsions that resolved spontaneously at rehab today, and with EMS he had an episode of self-limited gaze deviation and right upper extremity tremor.  He is nonverbal at this time, will open his eyes to command, but otherwise does not follow commands, and history is limited secondary to this.    For the patient's wife who arrived later at bedside, in July, he was able to ambulate independently, but has had multiple hospitalizations for sepsis.  He also has heart failure, and is on Entresto.  She reports that the nurse at [facility] told her that he was found down in a few days ago, but he did not get sent in for evaluation at that time.  She also was told that yesterday he was less responsive.  Review of patient's allergies indicates:  No Known Allergies  Past Medical History:   Diagnosis Date    Arthritis 1/28/2014    BPH (benign prostatic hyperplasia) 1/28/2014    CKD (chronic kidney disease) stage 3, GFR 30-59 ml/min 1/28/2014    Diabetes mellitus type 2, controlled, with complications 1/19/2016    DM type 2 (diabetes mellitus, type 2) 1/28/2014    Edema 1/28/2014    HTN (hypertension) 1/28/2014    Hyperlipidemia 1/28/2014     Past Surgical History:   Procedure Laterality Date    CATARACT EXTRACTION W/   INTRAOCULAR LENS IMPLANT  2012    left eye     Family History   Problem Relation Age of Onset    Cancer Brother     Cancer Sister      Social History     Tobacco Use    Smoking status: Former Smoker     Types: Cigarettes   Substance Use Topics    Alcohol use: No    Drug use: No     Review of Systems   Unable to perform ROS: Patient nonverbal       Physical Exam     Initial Vitals [09/22/20 0912]   BP Pulse Resp Temp SpO2   (!) 80/40 94 16 (!) 94 °F (34.4 °C) 98 %      MAP       --        Vital signs are notable for hypotension and hypothermia.  Physical Exam  Gen:  Eyes closed, arouses to loud voice and painful stimuli, opened eyes to command but does not consistently follow commands.  He appears chronically ill, cachectic.  Eye: sclera anicteric, EOMI, no conjunctivitis, no periorbital edema  ENT: NCAT, OP exam is limited, but there appears to be food in the cheeks, neck supple with FROM, no stridor  CVS:  Irregular bradycardia, and the 30s to 40s, no m/r/g, distal pulses intact/symmetric  Pulm:  Rhonchorous breath sounds in all lung fields, no increased work of breathing  Abd: soft, nontender, nondistended, no organomegaly, no CVAT  Ext: no edema, lesions, rashes, or deformity  Neuro: GCS15, moving all extremities, gait intact  Psych: normal affect, cooperative  ED Course   Procedures  Labs Reviewed   CBC W/ AUTO DIFFERENTIAL - Abnormal; Notable for the following components:       Result Value    RBC 2.43 (*)     Hemoglobin 7.9 (*)     Hematocrit 25.4 (*)     Mean Corpuscular Volume 105 (*)     Mean Corpuscular Hemoglobin 32.5 (*)     Mean Corpuscular Hemoglobin Conc 31.1 (*)     RDW 14.6 (*)     Lymph # 0.6 (*)     Gran% 88.8 (*)     Lymph% 6.4 (*)     Mono% 3.1 (*)     All other components within normal limits   COMPREHENSIVE METABOLIC PANEL - Abnormal; Notable for the following components:    Sodium 148 (*)     Potassium 2.9 (*)     Chloride 113 (*)     Glucose 68 (*)     BUN, Bld 30 (*)     Creatinine 2.0  (*)     Calcium 8.0 (*)     Albumin 1.7 (*)     ALT <5 (*)     eGFR if  34.4 (*)     eGFR if non  29.8 (*)     All other components within normal limits   URINALYSIS, REFLEX TO URINE CULTURE - Abnormal; Notable for the following components:    Appearance, UA Hazy (*)     Protein, UA 1+ (*)     Ketones, UA Trace (*)     Occult Blood UA 2+ (*)     All other components within normal limits    Narrative:     Specimen Source->Urine   TROPONIN I - Abnormal; Notable for the following components:    Troponin I 0.031 (*)     All other components within normal limits   URINALYSIS MICROSCOPIC - Abnormal; Notable for the following components:    RBC, UA 8 (*)     All other components within normal limits    Narrative:     Specimen Source->Urine   CULTURE, BLOOD   CULTURE, BLOOD   LACTIC ACID, PLASMA   LACTIC ACID, PLASMA   SARS-COV-2 RDRP GENE     EKG Readings: (Independently Interpreted)   Sinus bradycardia with frequent PVCs       Imaging Results          CT Head Without Contrast (No Result on File)                 X-Ray Chest AP Portable (Final result)  Result time 09/22/20 10:24:31    Final result by Joanne Snyder MD (09/22/20 10:24:31)                 Impression:      Left upper lung cavitary lesion for which considerations would include infection versus malignancy.  There may be small adjacent pulmonary opacities or nodules.  Correlation with chest CT with IV contrast advised.    This report was flagged in Epic as abnormal.      Electronically signed by: Joanne Snyder  Date:    09/22/2020  Time:    10:24             Narrative:    EXAMINATION:  XR CHEST AP PORTABLE    CLINICAL HISTORY:  Sepsis;    TECHNIQUE:  Single frontal view of the chest was performed.    COMPARISON:  None    FINDINGS:  The lungs are well inflated.  Cavitary lesion in the left upper lung measures approximately 5 cm.  Adjacent patchy opacities.  On the right, a few streaky opacities in the upper lung.  No lobar  consolidation or significant pleural effusion.  Skin fold overlies the right hemithorax.  Cardiac silhouette is normal in size.                                 Medical Decision Making:   History:   I obtained history from: EMS provider.  Old Medical Records: I decided to obtain old medical records.  Old Records Summarized: records from another hospital.  Initial Assessment:   This is an 85-year-old man with recent multiple admissions for sepsis who presents with acute onset hypothermia, hypotension, bradycardia and altered mental status.  I suspect he has severe sepsis versus intracranial hemorrhage versus acute multi-system organ failure given his recent illness and fall.  He does have a history of heart failure, and I did perform a bedside ultrasound of his heart, which was notable for diminished ejection fraction, no B lines bilaterally in the lungs, trace pericardial effusion.  We have administered a total of 1.5 L of normal saline, but he remains hypotensive and I am concerned that more fluids would lead to pulmonary edema, so we have transition to Levophed for pressor support.  He continues to have bradycardia, and we did try atropine which was ineffective, and have also now added and epinephrine continuous infusion.  I had a long discussion with the patient's wife at bedside, regarding his critical status, his poor prognosis, and she ultimately decided to make the patient DNR/DNI.  I think this is reasonable given his overall poor functional status, and decreased likelihood of recovery.  I discussed the patient with Critical Care Medicine and Cardiology, cardiology did not feel like the patient was account of that at this time for the temp wire or emergent pacemaker, given his likely septic etiology.  His heart rate improved with epinephrine infusion.  We placed a right radial artery line, but have deferred central access or intubation per his goals of care.  Plan for admission to the ICU for further  management.              Attending Attestation:         Attending Critical Care:   Critical Care Times:   Direct Patient Care (initial evaluation, reassessments, and time considering the case)................................................................40 minutes.   Additional History from reviewing old medical records or taking additional history from the family, EMS, PCP, etc.......................15 minutes.   Ordering, Reviewing, and Interpreting Diagnostic Studies...............................................................................................................10 minutes.   Documentation..................................................................................................................................................................................5 minutes.   Consultation with other Physicians. .................................................................................................................................................10 minutes.   Consultation with the patient's family directly relating to the patient's condition, care, and DNR status (when patient unable)......15 minutes.   ==============================================================  · Total Critical Care Time - exclusive of procedural time: 95 minutes.  ==============================================================  Critical care was necessary to treat or prevent imminent or life-threatening deterioration of the following conditions: sepsis and cardiac arrhythmia.   The following critical care procedures were done by me (see procedure notes): pulse oximetry and arterial line placement.   Critical care was time spent personally by me on the following activities: obtaining history from patient or relative, examination of patient, review of old charts, ordering lab, x-rays, and/or EKG, development of treatment plan with patient or relative, ordering and performing treatments and interventions, evaluation of  patient's response to treatment, discussion with consultants, interpretation of cardiac measurements, re-evaluation of patient's conition and end of life discussions.   Critical Care Condition: critical                         Clinical Impression:       ICD-10-CM ICD-9-CM   1. Seizures complicating infection  R56.9 780.39   2. Sepsis  A41.9 038.9     995.91   3. Shock  R57.9 785.50                          ED Disposition Condition    Admit                             Tiny Antonio MD  09/22/20 [id number]

## 2020-09-22 NOTE — ED NOTES
DNR papers signed and at the bedside. Per Critical Care do not titrate pressers, keep medication the same and make no changes even in the event of decreased BP.  Patient will moved to palliative care.

## 2020-09-22 NOTE — H&P
[facility]  Critical Care Medicine  History & Physical    Patient Name: Eric Delvalle  MRN: [id number]  Admission Date: 9/22/2020  Hospital Length of Stay: 0 days  Code Status: DNR  Attending Physician: Efraín Paez MD  Primary Care Provider: Orlin Goel MD   Principal Problem: Shock    Subjective:     HPI:  Mr. Delvalle is an 85 year old year old male with a PMH of HFrEF (EF 30-35%) and recent MSSA bacteremia (with MRSA in the urine) who presents from [facility] for altered mental status. Per his wife Katia, patient was fairly functional with his ADLs prior to this year. In January, he was hospitalized for pneumonia and never fully recovered. Recently he was hospitalized in August for a CHF exacerbation, then re-admitted to [facility] 8/31-9/11 for bacteremia. He was ultimately discharged to SNF with ancef through a midline for a 14 day course of antibiotics. Today, the SNF noted him to have seizure like activity and he was brought to the ED. He was noted to be quite obtunded and had further seizure like activity in the ED. An a line was started and he was started on pressors with norepi at 0.5 and epi at 0.02, but the decision was made not to intubate as per his wife's wishes.     His wife Katia expressed very good understanding of the situation and understands that he is dying. She stated that his body has been through a lot and she does not want to prolong his suffering and she does not want any more invasive procedures done to his body. She does not want him to be put on a ventilator, and does not want him to receive chest compressions.     She is calling his brother and her children to see if anyone would like to come to bedside. If he further decompensates prior to this, she does not want any escalation of care. Once other family members come, she would like to transition goals to comfort care.             Hospital/ICU Course:  No notes on file     Past Medical History:    Diagnosis Date    Arthritis 1/28/2014    BPH (benign prostatic hyperplasia) 1/28/2014    CKD (chronic kidney disease) stage 3, GFR 30-59 ml/min 1/28/2014    Diabetes mellitus type 2, controlled, with complications 1/19/2016    DM type 2 (diabetes mellitus, type 2) 1/28/2014    Edema 1/28/2014    HTN (hypertension) 1/28/2014    Hyperlipidemia 1/28/2014       Past Surgical History:   Procedure Laterality Date    CATARACT EXTRACTION W/  INTRAOCULAR LENS IMPLANT  2012    left eye       Review of patient's allergies indicates:  No Known Allergies    Family History     Problem Relation (Age of Onset)    Cancer Brother, Sister        Tobacco Use    Smoking status: Former Smoker     Types: Cigarettes   Substance and Sexual Activity    Alcohol use: No    Drug use: No    Sexual activity: Not on file      Review of SystemsComprehensive ROS unable to be performed 2/2 patient's mental status    Objective:     Vital Signs (Most Recent):  Temp: (!) 93.4 °F (34.1 °C) (09/22/20 1427)  Pulse: (!) 38 (09/22/20 1427)  Resp: 16 (09/22/20 0912)  BP: (!) 121/58 (09/22/20 1427)  SpO2: 99 % (09/22/20 1427) Vital Signs (24h Range):  Temp:  [93.2 °F (34 °C)-94 °F (34.4 °C)] 93.4 °F (34.1 °C)  Pulse:  [32-94] 38  Resp:  [16] 16  SpO2:  [97 %-100 %] 99 %  BP: ()/(40-60) 121/58   Weight: 50 kg (110 lb 3.7 oz)  Body mass index is 16.76 kg/m².      Intake/Output Summary (Last 24 hours) at 9/22/2020 1451  Last data filed at 9/22/2020 1354  Gross per 24 hour   Intake 1150 ml   Output --   Net 1150 ml       Physical Exam  Constitutional:       Appearance: He is ill-appearing.   HENT:      Head: Normocephalic and atraumatic.   Cardiovascular:      Rate and Rhythm: Regular rhythm. Bradycardia present.   Pulmonary:      Effort: Respiratory distress present.      Breath sounds: Rhonchi present.   Abdominal:      General: Bowel sounds are normal. There is no distension.   Musculoskeletal:         General: No deformity.      Right  lower leg: No edema.      Left lower leg: No edema.   Skin:     General: Skin is warm and dry.      Findings: No erythema or rash.   Neurological:      Comments: Obtunded, withdraws from pain   Psychiatric:      Comments: Unable to assess         Vents:     Lines/Drains/Airways     Drain                 Urethral Catheter 09/22/20 1153 Temperature probe less than 1 day          Peripheral Intravenous Line                 Peripheral IV - Single Lumen 09/22/20 1030 20 G Right Antecubital less than 1 day              Significant Labs:    CBC/Anemia Profile:  Recent Labs   Lab 09/22/20  0950   WBC 8.59   HGB 7.9*   HCT 25.4*      *   RDW 14.6*        Chemistries:  Recent Labs   Lab 09/22/20  0950   *   K 2.9*   *   CO2 23   BUN 30*   CREATININE 2.0*   CALCIUM 8.0*   ALBUMIN 1.7*   PROT 6.3   BILITOT 0.7   ALKPHOS 70   ALT <5*   AST 26           Significant Imaging: CXR: I have reviewed all pertinent results/findings within the past 24 hours and my personal findings are:  SHELTON cavitary lesion    Assessment/Plan:     Mr. Delvalle is an 85 year old male with a PMH of HFrEF (EF 30-35%) and recent MSSA bacteremia (with MRSA in the urine) who presents from [facility] for altered mental status. Admitted to the ICU with shock.    #Shock  -Patient presented with profound hypotension, quickly titrated to 0.5 of norepi and 0.02 of epi. Suspect sepsis given known bacteremia on Ancef OP, multifocal cavitary pneumonia (sputum never obtained). Has indwelling midline from previous admission. Had an MRSA UTI at previous admission. Also concerned for possible myxedema coma given hypotension, bradycardia, and hypothermia  -Patient received vanc/cefepime in the ED. Wife considering transition to comfort care, deferring central line at this time  -Continue pressors for now, discussions ongoing  -If not transitioning to comfort care, will continue vanc and cefepime and obtain further chest imaging and  sputum culture  -TSH pending  -Possible component of cardiogenic shock given hx of HFrEF and bradycardia    #Bradycardia  -Sinus bradycardia with frequent PVCs, was not bradycardic on previous admission  -Replete K >4 and Mg >2  -Continue inotropic support for now    #AMS  -Patient with seizure like activity at the [facility], [facility] pending. Could also be metabolic encephalopathy from sepsis   -Keppra loaded in ED. Continue to monitor for now. If forgoing comfort care consider MRI/EEG    #HFrEF  -Patient with a hx of HFrEF, EF 30-35% at recent admission to [facility]. Possible component of cardiogenic shock, but extremities are warm. Forgoing central line for now given wife's preference   -Holding goal directed therapy for now    #VIGRINIE  -Cr 2.0 from BL 1.0, prerenal v cardiorenal v ATN  -CTM , making good urine    Overall, patient is critically ill and has had a gradual decline given multiple recent admissions, and now in shock. Discussed with wife who has very appropriate understanding of his illness and gradual decline. She has expressed that she does not want CPR or intubation, discussions are ongoing regarding full transition to comfort care.         Critical Care Daily Checklist:    A: Awake: RASS Goal/Actual Goal:    Actual:     B: Spontaneous Breathing Trial Performed?     C: SAT & SBT Coordinated?  N/A                      D: Delirium: CAM-ICU     E: Early Mobility Performed? Yes   F: Feeding Goal:    Status:     Current Diet Order   Procedures    Diet NPO      AS: Analgesia/Sedation PRN for comfort   T: Thromboembolic Prophylaxis SCD   H: HOB > 300 Yes   U: Stress Ulcer Prophylaxis (if needed) N/A   G: Glucose Control N/A   B: Bowel Function     I: Indwelling Catheter (Lines & Hernandez) Necessity A line   D: De-escalation of Antimicrobials/Pharmacotherapies F/U Cx    Plan for the day/ETD F/U family goals of care    Code Status:  Family/Goals of Care: DNR  Possible transition to comfort care        Critical secondary to  septic shock        Critical care was time spent personally by me on the following activities: development of treatment plan with patient or surrogate and bedside caregivers, discussions with consultants, evaluation of patient's response to treatment, examination of patient, ordering and performing treatments and interventions, ordering and review of laboratory studies, ordering and review of radiographic studies, pulse oximetry, re-evaluation of patient's condition. This critical care time did not overlap with that of any other provider or involve time for any procedures.     Patient seen and discussed with staff Dr. Philippe Nevarez MD  Pulmonary and Critical Care Fellow  09/22/2020  4:34 PM

## 2020-09-22 NOTE — SUBJECTIVE & OBJECTIVE
Past Medical History:   Diagnosis Date    Arthritis 1/28/2014    BPH (benign prostatic hyperplasia) 1/28/2014    CKD (chronic kidney disease) stage 3, GFR 30-59 ml/min 1/28/2014    Diabetes mellitus type 2, controlled, with complications 1/19/2016    DM type 2 (diabetes mellitus, type 2) 1/28/2014    Edema 1/28/2014    HTN (hypertension) 1/28/2014    Hyperlipidemia 1/28/2014       Past Surgical History:   Procedure Laterality Date    CATARACT EXTRACTION W/  INTRAOCULAR LENS IMPLANT  2012    left eye       Review of patient's allergies indicates:  No Known Allergies    Family History     Problem Relation (Age of Onset)    Cancer Brother, Sister        Tobacco Use    Smoking status: Former Smoker     Types: Cigarettes   Substance and Sexual Activity    Alcohol use: No    Drug use: No    Sexual activity: Not on file      Review of SystemsComprehensive ROS unable to be performed 2/2 patient's mental status    Objective:     Vital Signs (Most Recent):  Temp: (!) 93.4 °F (34.1 °C) (09/22/20 1427)  Pulse: (!) 38 (09/22/20 1427)  Resp: 16 (09/22/20 0912)  BP: (!) 121/58 (09/22/20 1427)  SpO2: 99 % (09/22/20 1427) Vital Signs (24h Range):  Temp:  [93.2 °F (34 °C)-94 °F (34.4 °C)] 93.4 °F (34.1 °C)  Pulse:  [32-94] 38  Resp:  [16] 16  SpO2:  [97 %-100 %] 99 %  BP: ()/(40-60) 121/58   Weight: 50 kg (110 lb 3.7 oz)  Body mass index is 16.76 kg/m².      Intake/Output Summary (Last 24 hours) at 9/22/2020 1451  Last data filed at 9/22/2020 1354  Gross per 24 hour   Intake 1150 ml   Output --   Net 1150 ml       Physical Exam  Constitutional:       Appearance: He is ill-appearing.   HENT:      Head: Normocephalic and atraumatic.   Cardiovascular:      Rate and Rhythm: Regular rhythm. Bradycardia present.   Pulmonary:      Effort: Respiratory distress present.      Breath sounds: Rhonchi present.   Abdominal:      General: Bowel sounds are normal. There is no distension.   Musculoskeletal:         General: No  deformity.      Right lower leg: No edema.      Left lower leg: No edema.   Skin:     General: Skin is warm and dry.      Findings: No erythema or rash.   Neurological:      Comments: Obtunded, withdraws from pain   Psychiatric:      Comments: Unable to assess         Vents:     Lines/Drains/Airways     Drain                 Urethral Catheter 09/22/20 1153 Temperature probe less than 1 day          Peripheral Intravenous Line                 Peripheral IV - Single Lumen 09/22/20 1030 20 G Right Antecubital less than 1 day              Significant Labs:    CBC/Anemia Profile:  Recent Labs   Lab 09/22/20  0950   WBC 8.59   HGB 7.9*   HCT 25.4*      *   RDW 14.6*        Chemistries:  Recent Labs   Lab 09/22/20  0950   *   K 2.9*   *   CO2 23   BUN 30*   CREATININE 2.0*   CALCIUM 8.0*   ALBUMIN 1.7*   PROT 6.3   BILITOT 0.7   ALKPHOS 70   ALT <5*   AST 26           Significant Imaging: CXR: I have reviewed all pertinent results/findings within the past 24 hours and my personal findings are:  SHELTON cavitary lesion

## 2020-09-23 PROBLEM — R41.82 ALTERED MENTAL STATE: Status: ACTIVE | Noted: 2020-01-01

## 2020-09-23 PROBLEM — N17.9 AKI (ACUTE KIDNEY INJURY): Status: ACTIVE | Noted: 2020-01-01

## 2020-09-23 NOTE — PROGRESS NOTES
Pharmacokinetic Assessment Follow Up: IV Vancomycin    Vancomycin serum concentration assessment(s):    Vancomycin random level resulted at 4.6 mcg/mL approximately 20 hours after the previous dose. Goal level is 15 to 20 mcg/mL.     Drug levels (last 3 results):  Recent Labs   Lab Result Units 09/23/20  0605   Vancomycin, Random ug/mL 4.6     Vancomycin Regimen Plan:    Administer vancomycin IV 1750 mg and redose when the random level is less than 20 mcg/mL. Next level to be drawn with morning labs.     Pharmacy will continue to follow and monitor vancomycin.    Please contact pharmacy at extension [phone] for questions regarding this assessment.    Thank you for the consult,   Chantale Díaz, PharmD, BCCCP             Patient brief summary:  Eric Delvalle is a 85 y.o. male initiated on antimicrobial therapy with IV vancomycin for treatment of bacteremia    Drug Allergies:   Review of patient's allergies indicates:  No Known Allergies    Actual Body Weight:   57.1 kg     Renal Function:   Estimated Creatinine Clearance: 19.3 mL/min (A) (based on SCr of 2.3 mg/dL (H)).    Dialysis Method (if applicable):  N/A    CBC (last 72 hours):  Recent Labs   Lab Result Units 09/22/20  0950 09/23/20  0405   WBC K/uL 8.59 12.39   Hemoglobin g/dL 7.9* 9.9*   Hematocrit % 25.4* 31.5*   Platelets K/uL 277 364*   Gran% % 88.8* 91.7*   Lymph% % 6.4* 3.1*   Mono% % 3.1* 3.8*   Eosinophil% % 1.3 0.9   Basophil% % 0.1 0.2   Differential Method  Automated Automated       Metabolic Panel (last 72 hours):  Recent Labs   Lab Result Units 09/22/20  0950 09/22/20  1215 09/22/20  2106 09/23/20  0405   Sodium mmol/L 148*  --  143 139   Potassium mmol/L 2.9*  --  2.4* 3.1*   Chloride mmol/L 113*  --  110 110   CO2 mmol/L 23  --  18* 17*   Glucose mg/dL 68*  --  273* 229*   Glucose, UA   --  Negative  --   --    BUN, Bld mg/dL 30*  --  33* 32*   Creatinine mg/dL 2.0*  --  2.0* 2.3*   Albumin g/dL 1.7*  --   --  1.8*   Total Bilirubin mg/dL 0.7  --    --  0.8   Alkaline Phosphatase U/L 70  --   --  87   AST U/L 26  --   --  17   ALT U/L <5*  --   --  <5*   Magnesium mg/dL  --   --   --  2.1       Vancomycin Administrations:  vancomycin given in the last 96 hours                   vancomycin 1.75 g in 5 % dextrose 500 mL IVPB (mg) 1,750 mg New Bag 09/23/20 0842    vancomycin 2 g in dextrose 5 % 500 mL IVPB (mg) 2,000 mg New Bag 09/22/20 1021                Microbiologic Results:  Microbiology Results (last 7 days)     Procedure Component Value Units Date/Time    Blood culture x two cultures. Draw prior to antibiotics. [[id number]] Collected: 09/22/20 0950    Order Status: Completed Specimen: Blood from Peripheral, Hand, Right Updated: 09/23/20 1212     Blood Culture, Routine No Growth to date      No Growth to date    Narrative:      Aerobic and anaerobic    Blood culture x two cultures. Draw prior to antibiotics. [[id number]] Collected: 09/22/20 0951    Order Status: Completed Specimen: Blood from Midline, Cephalic, Left Updated: 09/23/20 0122     Blood Culture, Routine Gram stain la bottle: Gram positive cocci in chains resembling Strep      Results called to and read back by: Beronica Portillo RN  09/23/2020        01:22    Narrative:      Aerobic and anaerobic    Culture, Respiratory with Gram Stain [[id number]]     Order Status: No result Specimen: Respiratory

## 2020-09-23 NOTE — SUBJECTIVE & OBJECTIVE
Interval History/Significant Events: Seen and examined this morning. No acute events overnight. Patient awake, but not oriented. Incoherent speech. Wife at bedside. No seizure-like activity noticed. Will continue abx, supportive therapy, and continued goals of care talks today. ROS unable to be performed. Will continue to monitor.    Review of Systems   Unable to perform ROS: Mental status change     Objective:     Vital Signs (Most Recent):  Temp: 98.8 °F (37.1 °C) (09/23/20 1000)  Pulse: 96 (09/23/20 1000)  Resp: (!) 33 (09/23/20 1000)  BP: (!) 97/55 (09/23/20 0400)  SpO2: 98 % (09/23/20 1000) Vital Signs (24h Range):  Temp:  [93.2 °F (34 °C)-99.9 °F (37.7 °C)] 98.8 °F (37.1 °C)  Pulse:  [] 96  Resp:  [13-35] 33  SpO2:  [90 %-100 %] 98 %  BP: ()/(44-96) 97/55  Arterial Line BP: ()/(39-53) 90/52   Weight: 57.1 kg (125 lb 14.1 oz)  Body mass index is 19.14 kg/m².      Intake/Output Summary (Last 24 hours) at 9/23/2020 1108  Last data filed at 9/23/2020 1000  Gross per 24 hour   Intake 4979.73 ml   Output 610 ml   Net 4369.73 ml       Physical Exam  Constitutional:       Appearance: He is ill-appearing.   HENT:      Head: Normocephalic and atraumatic.   Cardiovascular:      Rate and Rhythm: Regular rhythm. Bradycardia present.   Pulmonary:      Effort: Respiratory distress present.      Breath sounds: Rhonchi present.   Abdominal:      General: Bowel sounds are normal. There is no distension.   Musculoskeletal:         General: No deformity.      Right lower leg: No edema.      Left lower leg: No edema.   Skin:     General: Skin is warm and dry.      Findings: No erythema or rash.   Neurological:      Comments: Obtunded, withdraws from pain   Psychiatric:      Comments: Unable to assess         Vents:     Lines/Drains/Airways     Drain                 Urethral Catheter 09/22/20 1153 Temperature probe less than 1 day          Arterial Line            Arterial Line 09/22/20 1130 Right Radial less than  1 day          Peripheral Intravenous Line                 Midline Catheter Insertion/Assessment  - Single Lumen 09/11/20 0000 Left 12 days         Peripheral IV - Single Lumen 09/22/20 1030 20 G Right Antecubital 1 day              Significant Labs:    CBC/Anemia Profile:  Recent Labs   Lab 09/22/20  0950 09/23/20  0405   WBC 8.59 12.39   HGB 7.9* 9.9*   HCT 25.4* 31.5*    364*   * 102*   RDW 14.6* 14.7*        Chemistries:  Recent Labs   Lab 09/22/20  0950 09/22/20  2106 09/23/20  0405   * 143 139   K 2.9* 2.4* 3.1*   * 110 110   CO2 23 18* 17*   BUN 30* 33* 32*   CREATININE 2.0* 2.0* 2.3*   CALCIUM 8.0* 8.7 8.8   ALBUMIN 1.7*  --  1.8*   PROT 6.3  --  7.0   BILITOT 0.7  --  0.8   ALKPHOS 70  --  87   ALT <5*  --  <5*   AST 26  --  17   MG  --   --  2.1       Blood Culture:   Recent Labs   Lab 09/22/20  0950 09/22/20  0951   LABBLOO No Growth to date Gram stain la bottle: Gram positive cocci in chains resembling Strep  Results called to and read back by: Beronica Portillo RN  09/23/2020    01:22     BMP:   Recent Labs   Lab 09/23/20  0405   *      K 3.1*      CO2 17*   BUN 32*   CREATININE 2.3*   CALCIUM 8.8   MG 2.1     CMP:   Recent Labs   Lab 09/22/20  0950 09/22/20  2106 09/23/20  0405   * 143 139   K 2.9* 2.4* 3.1*   * 110 110   CO2 23 18* 17*   GLU 68* 273* 229*   BUN 30* 33* 32*   CREATININE 2.0* 2.0* 2.3*   CALCIUM 8.0* 8.7 8.8   PROT 6.3  --  7.0   ALBUMIN 1.7*  --  1.8*   BILITOT 0.7  --  0.8   ALKPHOS 70  --  87   AST 26  --  17   ALT <5*  --  <5*   ANIONGAP 12 15 12   EGFRNONAA 29.8* 29.8* 25.1*     Troponin:   Recent Labs   Lab 09/22/20  2323 09/23/20  0405 09/23/20  0605   TROPONINI 0.051* 0.073* 0.099*     All pertinent labs within the past 24 hours have been reviewed.    Significant Imaging:  CXR (9/23): SHELTON cavitary lesion

## 2020-09-23 NOTE — ASSESSMENT & PLAN NOTE
Sinus bradycardia with frequent PVCs, was not bradycardic on previous admission    Plan:  - Replete K >4 and Mg >2  - Continue inotropic support for now

## 2020-09-23 NOTE — PROCEDURES
"Eric Delvalle is a 85 y.o. male patient.    Temp: 98.2 °F (36.8 °C) (09/23/20 1400)  Pulse: 87 (09/23/20 1400)  Resp: (!) 21 (09/23/20 1400)  BP: (!) 97/55 (09/23/20 0400)  SpO2: 96 % (09/23/20 1400)  Weight: 57.1 kg (125 lb 14.1 oz) (09/22/20 1930)  Height: 5' 8" (172.7 cm) (09/22/20 0912)       Central Line    Date/Time: 9/23/2020 2:18 PM  Performed by: Carlos Tomlin MD  Authorized by: Carlos Tomlin MD     Supervisor Name:  Beatris June NP  Location procedure was performed:  [facility] CRITICAL CARE MEDICINE  Consent Done ?:  Yes  Time out complete?: Verified correct patient, procedure, equipment, staff, and site/side    Indications:  Med administration and vascular access  Anesthesia:  Local infiltration  Local anesthetic:  Lidocaine 1% without epinephrine  Anesthetic total (ml):  5  Preparation:  Skin prepped with ChloraPrep  Skin prep agent dried: Skin prep agent completely dried prior to procedure    Sterile barriers: All five maximal sterile barriers used - gloves, gown, cap, mask and large sterile sheet    Hand hygiene: Hand hygiene performed immediately prior to central venous catheter insertion    Location:  Right femoral  Site selection rationale:  Inability to lay flat for IJ placement  Catheter type:  Triple lumen  Catheter size:  12 Fr  Inserted Catheter Length (cm):  20  Ultrasound guidance: Yes    Comprressibility:  Normal  Needle advanced into vessel with real time ultrasound guidance.    Guidewire confirmed in vessel.    Steril sheath on probe.    Manometry: Yes    Number of attempts:  1  Securement:  Line sutured, chlorhexidine patch, sterile dressing applied and blood return through all ports  Complications: No    Estimated blood loss (mL):  10  Adverse Events:  None        Carlos Tomlin, PGY-1  9/23/2020  "

## 2020-09-23 NOTE — NURSING
Clarified with MD that that patient is on 0.6 of levo and 0.08 of epi with correct ordered dosing weight.

## 2020-09-23 NOTE — CONSULTS
Consult received per MD for left heel and coccyx.     Pt was admitted on 9/22/20 from [facility] with septic shock. Pt has a PMHx of (EF 30-35%) and recent MSSA bacteremia (with MRSA in the urine).     Per pt's nurse, pt may be transitioning to comfort care and that pt is hypotensive and may not be hemodynamically stable to turn. Family at bedside. It was agreed upon to let the family have uninterrupted time with pt. Wound care will place orders for TRIAD to be applied to sacrum BID with nursing to continue pressure prevention interventions as directed.    Recommendations:  -Nursing to cleanse sacrum with cleansing wipes and apply Triad ointment BID and prn cleaning.  Nursing to maintain pressure injury prevention interventions as directed and for comfort. Triad ointment provides a hydrophilic environment to promote healing of exposed tissue and prevents breakdown of intact skin.  -Nursing to maintain pressure injury prevention interventions as directed and for comfort. Pressure injury prevention interventions to include assisting pt with turning and repositioning with wedge pillow, heel protectors and waffle cushion overlay.      Plan of care discussed with pt's nurse. Wound care will continue to follow pt PRN. [id number]

## 2020-09-23 NOTE — NURSING
Pt called to bedside. Pt is now awake and fidgeting around in bed and mumbling. Art line reading BP 70s/40s Map 50s with good waveform, unable to get cuff pressure on arm, but on leg it reads 130s/60s. Md would like to titrate for MAP of 60 per art line. Awaiting further orders

## 2020-09-23 NOTE — NURSING
Pump not set to correct ordered dosing rate for epi and levo. Pump corrected, I&Os to be corrected.

## 2020-09-23 NOTE — PLAN OF CARE
Per MD note, may be transitioning towards comfort care.  Wife at bedside and allowed to spend uninterrupted time w/ .  Assessment information obtained from medical record.  Will follow closely and remain available for dc planning needs.      [id number] - [place], [place] - [address] AT [facility] [address]  [address]  [facility] [address]  Phone: [phone] Fax: [phone]    Extended Emergency Contact Information  Primary Emergency Contact: phyllis gutierrez  Mobile Phone: [phone]  Relation: Spouse    Future Appointments   Date Time Provider Department Center   9/25/2020  7:00 AM [facility] SPEC LAB [facility] SPECLAB [facility]         Payor: [employer] MEDICARE / Plan: [employer] / Product Type: Medicare Advantage /       Shock [R57.9]  Sepsis [A41.9]  Seizures complicating infection [R56.9]         09/23/20 1229   Discharge Assessment   Assessment Type Discharge Planning Assessment   Confirmed/corrected address and phone number on facesheet? Yes   Assessment information obtained from? Medical Record   Prior to hospitilization cognitive status: Alert/Oriented   Prior to hospitalization functional status: Partially Dependent;Needs Assistance   Current cognitive status: Unable to Assess   Facility Arrived From: [facility]   Lives With facility resident   Able to Return to Prior Arrangements no   Is patient able to care for self after discharge? No   Who are your caregiver(s) and their phone number(s)? Phyllis Gutierrez (spouse) [phone]   Readmission Within the Last 30 Days unable to assess   Patient currently being followed by outpatient case management? No   Patient currently receives any other outside agency services? No   Equipment Currently Used at Home other (see comments)  (per facility)   Do you have any problems affording any of your prescribed medications? No   Is the patient taking  medications as prescribed? yes   Does the patient have transportation home? Yes   Transportation Anticipated agency   Discharge Plan A Skilled Nursing Facility   Discharge Plan B Inpatient Hospice   DME Needed Upon Discharge  other (see comments)  (TBD)       Isiah Correa MSN, RN-BC  Critical Care-   Ext. [phone]

## 2020-09-23 NOTE — NURSING
K+ 2.4. Pt currently getting 3rd bag of 10meq out of ordered 40meq. Md to order 50meq more. MD does not want to place NG tube for oral replacement at this time. WCTM.

## 2020-09-23 NOTE — ASSESSMENT & PLAN NOTE
Hx of HFrEF, EF 30-35% at recent admission to [facility]. Possible component of cardiogenic shock, but extremities are warm. Forgoing central line for now given wife's preference     Plan:  - Holding goal directed therapy for now

## 2020-09-23 NOTE — ASSESSMENT & PLAN NOTE
Presented with profound hypotension, quickly titrated to 0.5 of norepi and 0.02 of epi. Suspect sepsis given known bacteremia on Ancef OP, multifocal cavitary pneumonia (sputum never obtained). Has indwelling midline from previous admission. Had an MRSA UTI at previous admission.    Plan:  - Patient received vanc/cefepime in the ED. Wife considering transition to comfort care, deferring central line at this time  - Continue pressors for now, discussions ongoing  - If not transitioning to comfort care, will continue vanc and cefepime and obtain further chest imaging and sputum culture  - TSH WNL  - Possible component of cardiogenic shock given hx of HFrEF and bradycardia

## 2020-09-23 NOTE — ASSESSMENT & PLAN NOTE
Patient with witnessed seizure like activity and confusion/incoherence since    Plan:  - CTH negative  - Could also be metabolic encephalopathy from sepsis   - Keppra 500 mg q 12hr  - If forgoing comfort care consider MRI/EEG

## 2020-09-23 NOTE — PLAN OF CARE
See previous nursing notes for shift details. Overnight levo and epi slowly, but continuously titrated up to maintain MAP goal of now 60. Pt given 1mg of ativan this morning for agitation. UOP minimal 5-20cc/hr. Numerous Potasium replacements given per MAR    CMICU DAILY GOALS       A: Awake    RASS: Goal -    Actual -     Restraint necessity:    B: Breath   SBT: Not intubated   C: Coordinate A & B, analgesics/sedatives   Pain: managed    SAT: Not intubated  D: Delirium   CAM-ICU: Overall CAM-ICU: Positive  E: Early(intubated/ Progressive (non-intubated) Mobility   MOVE Screen: Fail   Activity: Activity Management: patient unable to perform activities  FAS: Feeding/Nutrition   Diet order: Diet/Nutrition Received: NPO,   Fluid restriction:    T: Thrombus   DVT prophylaxis: VTE Required Core Measure: (SCDs) Sequential compression device initiated/maintained  H: HOB Elevation   Head of Bed (HOB): HOB at 30 degrees  U: Ulcer Prophylaxis   GI: no  G: Glucose control   managed    S: Skin   Bundle compliance: yes   Bathing/Skin Care: bath, chlorhexidine, incontinence care, linen changed, bath, partial Date: 9/22/2020  B: Bowel Function   no issues   I: Indwelling Catheters   Hernandez necessity:      Urethral Catheter 09/22/20 1153 Temperature probe-Reason for Continuing Urinary Catheterization: Critically ill in ICU requiring intensive monitoring   CVC necessity: Yes   IPAD offered: No  D: De-escalation Antibx   No  Plan for the day   Possible withdrawal of care  Family/Goals of care/Code Status   Code Status: DNR     No acute events throughout day, VS and assessment per flow sheet, patient progressing towards goals as tolerated, plan of care reviewed with Eric Delvalle and family, all concerns addressed, will continue to monitor.

## 2020-09-23 NOTE — EICU
Comments: Called to the bedside for R Trialysis femoral line, Dr Carlos Tomlin with supervision from Beatris June NP, time out complete, privileges checked and complete,  Labs and allergies confirmed, consent obtained per NP, sterile technique maintained, sterile dressing with Biopatch applied, tolerated well

## 2020-09-23 NOTE — ASSESSMENT & PLAN NOTE
Presented with Cr 2.0 from baseline 1.0    Plan:  - Cr 2.3 from BL 1.0, prerenal v cardiorenal v ATN  - UO decreased to 5-20 cc/hour

## 2020-09-23 NOTE — PROGRESS NOTES
[facility]  Critical Care Medicine  Progress Note    Patient Name: Eric Delvalle  MRN: [id number]  Admission Date: 9/22/2020  Hospital Length of Stay: 1 days  Code Status: DNR  Attending Provider: Efraín Paez MD  Primary Care Provider: No primary care provider on file.   Principal Problem: Septic shock    Subjective:     HPI:  Mr. Delvalle is an 85 year old year old male with a PMH of HFrEF (EF 30-35%) and recent MSSA bacteremia (with MRSA in the urine) who presents from [facility] for altered mental status. Per his wife Katia, patient was fairly functional with his ADLs prior to this year. In January, he was hospitalized for pneumonia and never fully recovered. Recently he was hospitalized in August for a CHF exacerbation, then re-admitted to [facility] 8/31-9/11 for bacteremia. He was ultimately discharged to SNF with ancef through a midline for a 14 day course of antibiotics. Today, the SNF noted him to have seizure like activity and he was brought to the ED. He was noted to be quite obtunded and had further seizure like activity in the ED. An a line was started and he was started on pressors with norepi at 0.5 and epi at 0.02, but the decision was made not to intubate as per his wife's wishes.     His wife Katia expressed very good understanding of the situation and understands that he is dying. She stated that his body has been through a lot and she does not want to prolong his suffering and she does not want any more invasive procedures done to his body. She does not want him to be put on a ventilator, and does not want him to receive chest compressions.     She is calling his brother and her children to see if anyone would like to come to bedside. If he further decompensates prior to this, she does not want any escalation of care. Once other family members come, she would like to transition goals to comfort care.             Hospital/ICU Course:  No notes on file    Interval  History/Significant Events: Seen and examined this morning. No acute events overnight. Patient awake, but not oriented. Incoherent speech. Wife at bedside. No seizure-like activity noticed. Will continue abx, supportive therapy, and continued goals of care talks today. ROS unable to be performed. Will continue to monitor.    Review of Systems   Unable to perform ROS: Mental status change     Objective:     Vital Signs (Most Recent):  Temp: 98.8 °F (37.1 °C) (09/23/20 1000)  Pulse: 96 (09/23/20 1000)  Resp: (!) 33 (09/23/20 1000)  BP: (!) 97/55 (09/23/20 0400)  SpO2: 98 % (09/23/20 1000) Vital Signs (24h Range):  Temp:  [93.2 °F (34 °C)-99.9 °F (37.7 °C)] 98.8 °F (37.1 °C)  Pulse:  [] 96  Resp:  [13-35] 33  SpO2:  [90 %-100 %] 98 %  BP: ()/(44-96) 97/55  Arterial Line BP: ()/(39-53) 90/52   Weight: 57.1 kg (125 lb 14.1 oz)  Body mass index is 19.14 kg/m².      Intake/Output Summary (Last 24 hours) at 9/23/2020 1108  Last data filed at 9/23/2020 1000  Gross per 24 hour   Intake 4979.73 ml   Output 610 ml   Net 4369.73 ml       Physical Exam  Constitutional:       Appearance: He is ill-appearing.   HENT:      Head: Normocephalic and atraumatic.   Cardiovascular:      Rate and Rhythm: Regular rhythm. Bradycardia present.   Pulmonary:      Effort: Respiratory distress present.      Breath sounds: Rhonchi present.   Abdominal:      General: Bowel sounds are normal. There is no distension.   Musculoskeletal:         General: No deformity.      Right lower leg: No edema.      Left lower leg: No edema.   Skin:     General: Skin is warm and dry.      Findings: No erythema or rash.   Neurological:      Comments: Obtunded, withdraws from pain   Psychiatric:      Comments: Unable to assess         Vents:     Lines/Drains/Airways     Drain                 Urethral Catheter 09/22/20 1153 Temperature probe less than 1 day          Arterial Line            Arterial Line 09/22/20 1130 Right Radial less than 1 day           Peripheral Intravenous Line                 Midline Catheter Insertion/Assessment  - Single Lumen 09/11/20 0000 Left 12 days         Peripheral IV - Single Lumen 09/22/20 1030 20 G Right Antecubital 1 day              Significant Labs:    CBC/Anemia Profile:  Recent Labs   Lab 09/22/20  0950 09/23/20  0405   WBC 8.59 12.39   HGB 7.9* 9.9*   HCT 25.4* 31.5*    364*   * 102*   RDW 14.6* 14.7*        Chemistries:  Recent Labs   Lab 09/22/20  0950 09/22/20  2106 09/23/20  0405   * 143 139   K 2.9* 2.4* 3.1*   * 110 110   CO2 23 18* 17*   BUN 30* 33* 32*   CREATININE 2.0* 2.0* 2.3*   CALCIUM 8.0* 8.7 8.8   ALBUMIN 1.7*  --  1.8*   PROT 6.3  --  7.0   BILITOT 0.7  --  0.8   ALKPHOS 70  --  87   ALT <5*  --  <5*   AST 26  --  17   MG  --   --  2.1       Blood Culture:   Recent Labs   Lab 09/22/20  0950 09/22/20  0951   LABBLOO No Growth to date Gram stain la bottle: Gram positive cocci in chains resembling Strep  Results called to and read back by: Beronica Portillo RN  09/23/2020    01:22     BMP:   Recent Labs   Lab 09/23/20  0405   *      K 3.1*      CO2 17*   BUN 32*   CREATININE 2.3*   CALCIUM 8.8   MG 2.1     CMP:   Recent Labs   Lab 09/22/20  0950 09/22/20  2106 09/23/20  0405   * 143 139   K 2.9* 2.4* 3.1*   * 110 110   CO2 23 18* 17*   GLU 68* 273* 229*   BUN 30* 33* 32*   CREATININE 2.0* 2.0* 2.3*   CALCIUM 8.0* 8.7 8.8   PROT 6.3  --  7.0   ALBUMIN 1.7*  --  1.8*   BILITOT 0.7  --  0.8   ALKPHOS 70  --  87   AST 26  --  17   ALT <5*  --  <5*   ANIONGAP 12 15 12   EGFRNONAA 29.8* 29.8* 25.1*     Troponin:   Recent Labs   Lab 09/22/20  2323 09/23/20  0405 09/23/20  0605   TROPONINI 0.051* 0.073* 0.099*     All pertinent labs within the past 24 hours have been reviewed.    Significant Imaging:  CXR (9/23): SHELTON cavitary lesion      ABG  No results for input(s): PH, PO2, PCO2, HCO3, BE in the last 168 hours.  Assessment/Plan:     Neuro  Altered mental  state  Patient with witnessed seizure like activity and confusion/incoherence since    Plan:  - CTH negative  - Could also be metabolic encephalopathy from sepsis   - Keppra 500 mg q 12hr  - If forgoing comfort care consider MRI/EEG    Cardiac/Vascular  Bradycardia  Sinus bradycardia with frequent PVCs, was not bradycardic on previous admission    Plan:  - Replete K >4 and Mg >2  - Continue inotropic support for now    Chronic systolic heart failure  Hx of HFrEF, EF 30-35% at recent admission to [facility]. Possible component of cardiogenic shock, but extremities are warm. Forgoing central line for now given wife's preference     Plan:  - Holding goal directed therapy for now    Renal/  VIRGINIE (acute kidney injury)  Presented with Cr 2.0 from baseline 1.0    Plan:  - Cr 2.3 from BL 1.0, prerenal v cardiorenal v ATN  - UO decreased to 5-20 cc/hour    ID  * Septic shock  Presented with profound hypotension, quickly titrated to 0.5 of norepi and 0.02 of epi. Suspect sepsis given known bacteremia on Ancef OP, multifocal cavitary pneumonia (sputum never obtained). Has indwelling midline from previous admission. Had an MRSA UTI at previous admission.    Plan:  - Patient received vanc/cefepime in the ED. Wife considering transition to comfort care, deferring central line at this time  - Continue pressors for now, discussions ongoing  - If not transitioning to comfort care, will continue vanc and cefepime and obtain further chest imaging and sputum culture  - TSH WNL  - Possible component of cardiogenic shock given hx of HFrEF and bradycardia      Critical secondary to Patient has a condition that poses threat to life and bodily function: Sepsis with shock      Critical care was time spent personally by me on the following activities: development of treatment plan with patient or surrogate and bedside caregivers, discussions with consultants, evaluation of patient's response to treatment, examination of patient, ordering and  performing treatments and interventions, ordering and review of laboratory studies, ordering and review of radiographic studies, pulse oximetry, re-evaluation of patient's condition. This critical care time did not overlap with that of any other provider or involve time for any procedures.     Carlos Tomlin MD, PGY-1  Critical Care Medicine  [facility]

## 2020-09-24 PROBLEM — Z51.5 PALLIATIVE CARE ENCOUNTER: Status: ACTIVE | Noted: 2020-01-01

## 2020-09-24 PROBLEM — Z71.89 GOALS OF CARE, COUNSELING/DISCUSSION: Status: ACTIVE | Noted: 2020-01-01

## 2020-09-24 NOTE — PLAN OF CARE
CMICU DAILY GOALS       A: Awake    RASS: Goal - RASS Goal: 1-->restless  Actual - RASS (Basurto Agitation-Sedation Scale): 0-->alert and calm   Restraint necessity: Clinical Justification: Removing medical devices, Climbing out of bed  B: Breath   SBT: Not intubated   C: Coordinate A & B, analgesics/sedatives   Pain: managed    SAT: Not intubated  D: Delirium   CAM-ICU: Overall CAM-ICU: Positive  E: Early(intubated/ Progressive (non-intubated) Mobility   MOVE Screen: Fail   Activity: Activity Management: patient unable to perform activities  FAS: Feeding/Nutrition   Diet order: Diet/Nutrition Received: NPO,   Fluid restriction:    T: Thrombus   DVT prophylaxis: VTE Required Core Measure: (SCDs) Sequential compression device initiated/maintained  H: HOB Elevation   Head of Bed (HOB): HOB at 30 degrees  U: Ulcer Prophylaxis   GI: no  G: Glucose control   managed    S: Skin   Bundle compliance: yes   Bathing/Skin Care: bath, chlorhexidine, incontinence care, linen changed, bath, partial Date: 9/24  B: Bowel Function   no issues   I: Indwelling Catheters   Hernandez necessity:      Urethral Catheter 09/22/20 1153 Temperature probe-Reason for Continuing Urinary Catheterization: Critically ill in ICU requiring intensive monitoring   CVC necessity: Yes   IPAD offered: Not appropriate  D: De-escalation Antibx   No  Plan for the day   Pt does not respond to voice and does not follow commands, team may have another goals of care talk with family  Notified team about dosing weight in MAR vs actual pt weight and MD ROULA White corrected orders. Corrected calculated doses changed to .88mcg/kg of levo and .15mcg/kg of of epi. MD did not want to add vaso, WCTM VS   Family/Goals of care/Code Status   Code Status: DNR     VS and assessment per flow sheet, patient progressing towards goals as tolerated, plan of care reviewed with Eric Delvalle and family, all concerns addressed, will continue to monitor.

## 2020-09-24 NOTE — SUBJECTIVE & OBJECTIVE
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    DM type 2 (diabetes mellitus, type 2) 1/28/2014    Edema 1/28/2014    HTN (hypertension) 1/28/2014    Hyperlipidemia 1/28/2014       Past Surgical History:   Procedure Laterality Date    CATARACT EXTRACTION W/  INTRAOCULAR LENS IMPLANT  2012    left eye       Review of patient's allergies indicates:  No Known Allergies    Medications:  Continuous Infusions:   epinephrine 0.08 mcg/kg/min (09/24/20 1400)    morphine 3 mg/hr (09/24/20 1400)    norepinephrine bitartrate-D5W 0.74 mcg/kg/min (09/24/20 1400)     Scheduled Meds:   ceFEPime (MAXIPIME) IVPB  2 g Intravenous Q24H    haloperidol lactate  2 mg Intramuscular Once    levetiracetam IVPB  500 mg Intravenous Q12H     PRN Meds:dextrose 50%, glucagon (human recombinant), insulin aspart U-100, Pharmacy to dose Vancomycin consult **AND** vancomycin - pharmacy to dose    Family History     Problem Relation (Age of Onset)    Cancer Brother, Sister        Tobacco Use    Smoking status: Former Smoker     Types: Cigarettes   Substance and Sexual Activity    Alcohol use: No    Drug use: No    Sexual activity: Not on file       Review of Systems   Unable to perform ROS: Patient nonverbal     Objective:     Vital Signs (Most Recent):  Temp: 98.6 °F (37 °C) (09/24/20 1400)  Pulse: 91 (09/24/20 1400)  Resp: (!) 25 (09/24/20 1400)  BP: (!) 97/55 (09/23/20 0400)  SpO2: 96 % (09/24/20 1400) Vital Signs (24h Range):  Temp:  [96.1 °F (35.6 °C)-100 °F (37.8 °C)] 98.6 °F (37 °C)  Pulse:  [70-99] 91  Resp:  [11-28] 25  SpO2:  [91 %-98 %] 96 %  Arterial Line BP: ()/(45-60) 98/50     Weight: 57.1 kg (125 lb 14.1 oz)  Body mass index is 19.14 kg/m².    Physical Exam  Vitals signs and nursing note  reviewed.   Constitutional:       Comments: Disoriented, poor enunciation,    HENT:      Head: Normocephalic.   Neck:      Musculoskeletal: Normal range of motion.   Cardiovascular:      Rate and Rhythm: Normal rate and regular rhythm.   Pulmonary:      Comments: Tachypnea   Abdominal:      General: Abdomen is flat.   Skin:     General: Skin is warm and dry.   Neurological:      Mental Status: He is alert. He is disoriented.         Review of Symptoms    Symptom Assessment (ESAS 0-10 Scale)  Pain:  0  Dyspnea:  0  Anxiety:  0  Nausea:  0  Depression:  0  Anorexia:  0  Fatigue:  0  Insomnia:  0  Restlessness:  0  Agitation:  0         Comments:  Patient is disoriented and has poor speech quality unable to complete ESAS, no facial grimacing or moaning. Pulse ox 91 % tachypnea,  Patient and family not amenable to intubation          Psychosocial/Cultural:  60 plus years 4 children, 7 grandchildren, retired, enjoys spending time with family and traveling to [facility] to visit with relatives     Spiritual:  F - Melanie and Belief:  Maria Elena   A - Address in Care:   services offered       Advance Care Planning   Advance Directives:   Living Will: No    [place]POST: No    Do Not Resuscitate Status: Yes    Medical Power of : No (Legal next of kin for medical decision making is wife )      Decision Making:  Family answered questions         Significant Labs: All pertinent labs within the past 24 hours have been reviewed.  CBC:   Recent Labs   Lab 09/24/20  0307   WBC 7.85   HGB 8.3*   HCT 26.3*   *        BMP:  Recent Labs   Lab 09/24/20  0307         K 3.6      CO2 18*   BUN 33*   CREATININE 2.4*   CALCIUM 8.5*   MG 2.0     LFT:  Lab Results   Component Value Date    AST 17 09/24/2020    ALKPHOS 74 09/24/2020    BILITOT 1.0 09/24/2020     Albumin:   Albumin   Date Value Ref Range Status   09/24/2020 1.7 (L) 3.5 - 5.2 g/dL Final     Protein:   Total Protein   Date Value Ref  Range Status   09/24/2020 6.5 6.0 - 8.4 g/dL Final     Lactic acid:   Lab Results   Component Value Date    LACTATE 3.7 (HH) 09/23/2020    LACTATE 0.8 09/22/2020       Significant Imaging: I have reviewed all pertinent imaging results/findings within the past 24 hours.

## 2020-09-24 NOTE — PROGRESS NOTES
[facility]  Critical Care Medicine  Progress Note    Patient Name: Eric Delvalle  MRN: [id number]  Admission Date: 9/22/2020  Hospital Length of Stay: 2 days  Code Status: DNR  Attending Provider: Efraín Paez MD  Primary Care Provider: No primary care provider on file.   Principal Problem: Septic shock    Subjective:     HPI:  Mr. Delvalle is an 85 year old year old male with a PMH of HFrEF (EF 30-35%) and recent MSSA bacteremia (with MRSA in the urine) who presents from [facility] for altered mental status. Per his wife Katia, patient was fairly functional with his ADLs prior to this year. In January, he was hospitalized for pneumonia and never fully recovered. Recently he was hospitalized in August for a CHF exacerbation, then re-admitted to [facility] 8/31-9/11 for bacteremia. He was ultimately discharged to SNF with ancef through a midline for a 14 day course of antibiotics. Today, the SNF noted him to have seizure like activity and he was brought to the ED. He was noted to be quite obtunded and had further seizure like activity in the ED. An a line was started and he was started on pressors with norepi at 0.5 and epi at 0.02, but the decision was made not to intubate as per his wife's wishes.     His wife Katia expressed very good understanding of the situation and understands that he is dying. She stated that his body has been through a lot and she does not want to prolong his suffering and she does not want any more invasive procedures done to his body. She does not want him to be put on a ventilator, and does not want him to receive chest compressions.     She is calling his brother and her children to see if anyone would like to come to bedside. If he further decompensates prior to this, she does not want any escalation of care. Once other family members come, she would like to transition goals to comfort care.     Hospital/ICU Course:  No notes on file    Interval  History/Significant Events: Seen and examined this morning. No acute events overnight. Wife at bedside. Pressor requirements have gone up slightly. Patient not as agitated as when examined the morning previous. Ongoing goals of care talks with Dr Paez have yielded decision to not escalate pressor requirements, and to allow time for family to visit before withdrawing supportive care. Will continue dialog with Mr Reyes's family and respect their wishes. Will continue to monitor.    Review of Systems   Unable to perform ROS: Acuity of condition     Objective:     Vital Signs (Most Recent):  Temp: 98.6 °F (37 °C) (09/24/20 1400)  Pulse: 91 (09/24/20 1400)  Resp: (!) 25 (09/24/20 1400)  BP: (!) 97/55 (09/23/20 0400)  SpO2: 96 % (09/24/20 1400) Vital Signs (24h Range):  Temp:  [96.1 °F (35.6 °C)-100 °F (37.8 °C)] 98.6 °F (37 °C)  Pulse:  [70-99] 91  Resp:  [11-28] 25  SpO2:  [91 %-98 %] 96 %  Arterial Line BP: ()/(45-60) 98/50   Weight: 57.1 kg (125 lb 14.1 oz)  Body mass index is 19.14 kg/m².      Intake/Output Summary (Last 24 hours) at 9/24/2020 1427  Last data filed at 9/24/2020 1400  Gross per 24 hour   Intake 2000.44 ml   Output 365 ml   Net 1635.44 ml       Physical Exam  Constitutional:       Appearance: He is ill-appearing.   HENT:      Head: Normocephalic and atraumatic.   Cardiovascular:      Rate and Rhythm: Regular rhythm. Bradycardia present.   Pulmonary:      Effort: Respiratory distress present.      Breath sounds: Rhonchi present.   Abdominal:      General: Bowel sounds are normal. There is no distension.   Musculoskeletal:         General: No deformity.      Right lower leg: No edema.      Left lower leg: No edema.   Skin:     General: Skin is warm and dry.      Findings: No erythema or rash.   Neurological:      Comments: Obtunded, withdraws from pain   Psychiatric:      Comments: Unable to assess          Lines/Drains/Airways     Central Venous Catheter Line            Trialysis (Dialysis)  Catheter 09/23/20 1314 right femoral 1 day          Drain                 Urethral Catheter 09/22/20 1153 Temperature probe 2 days          Arterial Line            Arterial Line 09/22/20 1130 Right Radial 2 days          Peripheral Intravenous Line                 Peripheral IV - Single Lumen 09/24/20 1210 20 G;1 3/4 in Right Forearm less than 1 day              Significant Labs:    CBC/Anemia Profile:  Recent Labs   Lab 09/23/20 0405 09/24/20 0307   WBC 12.39 7.85   HGB 9.9* 8.3*   HCT 31.5* 26.3*   * 265   * 104*   RDW 14.7* 14.7*        Chemistries:  Recent Labs   Lab 09/22/20 2106 09/23/20 0405 09/23/20 2008 09/24/20 0307    139  --  139   K 2.4* 3.1* 3.2* 3.6    110  --  110   CO2 18* 17*  --  18*   BUN 33* 32*  --  33*   CREATININE 2.0* 2.3*  --  2.4*   CALCIUM 8.7 8.8  --  8.5*   ALBUMIN  --  1.8*  --  1.7*   PROT  --  7.0  --  6.5   BILITOT  --  0.8  --  1.0   ALKPHOS  --  87  --  74   ALT  --  <5*  --  <5*   AST  --  17  --  17   MG  --  2.1  --  2.0       Blood Culture: No results for input(s): LABBLOO in the last 48 hours.  BMP:   Recent Labs   Lab 09/24/20 0307         K 3.6      CO2 18*   BUN 33*   CREATININE 2.4*   CALCIUM 8.5*   MG 2.0     CMP:   Recent Labs   Lab 09/22/20 2106 09/23/20 0405 09/23/20 2008 09/24/20 0307    139  --  139   K 2.4* 3.1* 3.2* 3.6    110  --  110   CO2 18* 17*  --  18*   * 229*  --  103   BUN 33* 32*  --  33*   CREATININE 2.0* 2.3*  --  2.4*   CALCIUM 8.7 8.8  --  8.5*   PROT  --  7.0  --  6.5   ALBUMIN  --  1.8*  --  1.7*   BILITOT  --  0.8  --  1.0   ALKPHOS  --  87  --  74   AST  --  17  --  17   ALT  --  <5*  --  <5*   ANIONGAP 15 12  --  11   EGFRNONAA 29.8* 25.1*  --  23.9*     All pertinent labs within the past 24 hours have been reviewed.    Significant Imaging:  No significant imaging in last 24 hours.      [facility]  Recent Labs   Lab 09/23/20  1235   PH 7.407   PO2 93   PCO2 22.3*   HCO3 14.1*    BE -11     Assessment/Plan:     Neuro  Altered mental state  Patient with witnessed seizure-like activity and confusion/incoherence since    Plan:  - CTH negative  - Could also be metabolic encephalopathy from sepsis   - Keppra 500 mg q 12hr    Cardiac/Vascular  Bradycardia  Sinus bradycardia with frequent PVCs, was not bradycardic on previous admission    Plan:  - Replete K >4 and Mg >2  - Continue inotropic support for now (epi)    Chronic systolic heart failure  Hx of HFrEF, EF 30-35% at recent admission to [facility]. Possible component of cardiogenic shock, but extremities are warm. Forgoing central line for now given wife's preference     Plan:  - Holding goal directed therapy for now    Renal/  VIRGINIE (acute kidney injury)  Presented with Cr 2.0 from baseline 1.0    Plan:  - Cr 2.4 from BL 1.0, prerenal v cardiorenal v ATN  - UO decreased to 5-20 cc/hour    ID  * Septic shock  Presented with profound hypotension, quickly titrated with norepi and epi. Suspect sepsis given known bacteremia on Ancef OP, multifocal cavitary pneumonia (sputum never obtained). Has indwelling midline from previous admission. Had an MRSA UTI at previous admission.    Plan:  - Patient received vanc/cefepime in the ED  - Continue pressors for now, discussions ongoing  - see goals of care discussion  - TSH WNL  - Possible component of cardiogenic shock given hx of HFrEF and bradycardia    Other  Goals of care, counseling/discussion  Mr Reyes is an 86 yo male with PMHx of HFrEF (EF 30-35%) who preseneted in septic shock and now has increased need for pressors. Hx of increasing dementia over the past year, and after his last admission for bacteremia, patient was admitted to nursing home    Plan:  - patient's wife Katia has been at bedside and has been engaged in goals of care discussions  - palliative consult  - decision has been made to not escalate pressors, not start dialysis, not perform chest compressions, and not intubate  - allow  time for family to arrive and visit before withdrawing to comfort care later this evening  - will continue dialogue with patient's family      Critical secondary to Patient has a condition that poses threat to life and bodily function: Severe Respiratory Distress      Critical care was time spent personally by me on the following activities: development of treatment plan with patient or surrogate and bedside caregivers, discussions with consultants, evaluation of patient's response to treatment, examination of patient, ordering and performing treatments and interventions, ordering and review of laboratory studies, ordering and review of radiographic studies, pulse oximetry, re-evaluation of patient's condition. This critical care time did not overlap with that of any other provider or involve time for any procedures.     Carlos Tomlin MD , PGY-1  Critical Care Medicine  [facility]

## 2020-09-24 NOTE — ASSESSMENT & PLAN NOTE
Sinus bradycardia with frequent PVCs, was not bradycardic on previous admission    Plan:  - Replete K >4 and Mg >2  - Continue inotropic support for now (epi)

## 2020-09-24 NOTE — PROGRESS NOTES
Pharmacokinetic Assessment Follow Up: IV Vancomycin    Vancomycin Regimen Assessment & Plan:  - Vancomycin level drawn with morning labs today resulted as 22.7 mcg/mL, goal trough 15-20 mcg/mL.  - Patient with VIRGINIE, will continue pulse dosing.  - No need for vancomycin dose today given level. Draw vancomycin level with morning labs tomorrow. Will re-dose as needed depending on level and renal function.    Drug levels (last 3 results):  Recent Labs   Lab Result Units 09/23/20  0605 09/24/20  0307   Vancomycin, Random ug/mL 4.6 22.7     Pharmacy will continue to follow and monitor vancomycin.    Please contact pharmacy at extension [phone] for questions regarding this assessment.    Thank you for the consult,   Kai Logan     Patient brief summary:  Eric Delvalle is a 85 y.o. male initiated on antimicrobial therapy with IV Vancomycin for treatment of bacteremia    The patient's current regimen is pulse dosing.    Drug Allergies:   Review of patient's allergies indicates:  No Known Allergies    Actual Body Weight:   57.1 kg    Renal Function:   Estimated Creatinine Clearance: 18.5 mL/min (A) (based on SCr of 2.4 mg/dL (H)).,     Dialysis Method (if applicable):  N/A

## 2020-09-24 NOTE — ASSESSMENT & PLAN NOTE
Hx of HFrEF, EF 30-35% at recent admission to [facility]. Possible component of cardiogenic shock, but extremities are warm. Forgoing central line for now given wife's preference     Plan:  - Holding goal directed therapy for now

## 2020-09-24 NOTE — ASSESSMENT & PLAN NOTE
Presented with profound hypotension, quickly titrated with norepi and epi. Suspect sepsis given known bacteremia on Ancef OP, multifocal cavitary pneumonia (sputum never obtained). Has indwelling midline from previous admission. Had an MRSA UTI at previous admission.    Plan:  - Patient received vanc/cefepime in the ED  - Continue pressors for now, discussions ongoing  - see goals of care discussion  - TSH WNL  - Possible component of cardiogenic shock given hx of HFrEF and bradycardia

## 2020-09-24 NOTE — ASSESSMENT & PLAN NOTE
"Palliative care consulted for goals of care.     Impression:  Mr. Miranda is a 84 yo gentleman admitted to critical care with septic shock.  He is awake, not oriented, appears comfortable - occasionally has moaning and facial grimacing.  Currently receiving antibiotics,  pressor support and morphine drip.  Supplemental oxygen per nasal cannula. Patient not amenable to intubation. No acute distress noted     Advance Care Planning     No advanced care planning documents received  - wife  Katia Miranda [phone] is next of kin for medical decision making   -DNR per primary team - family in agreement       Goals of Care   - palliative medicine met with patient at bedside.  Palliative medicine introduced.   - Mr. Miranda unable to participate in conversation  - Mrs. Miranda appears to have good understanding of current clinical conditon.   - Mr. Miranda, wife states her daughter is coming in from [place], on the bus, and would like daughter to be involved in discussions too.   - Mrs. Miranda states quality of life has been poor since he left the other hospital and had "gone down" more since being in the nursing home.  Her wish is he not suffer.   - She is opposed to life support - intubation   - Mrs. Mirandas states her understanding is the  pressor medications are life support for him.  She reports he would not choose to have prolonged life support.    - She also states if the patient has significant change she does not want the doctors to had more medications or increase his current doses.    - She remains hopeful her daughter will arrive in time to see him and talk with the doctors,  - Mrs. Miranda states he would choose to have a natural death and that is what she want too.   - It also appears wife  is amenable to withdrawing pressors after her daughter arrives.     Plan/Recommendations  - continue current plan of care  - anticipate withdrawal of life support - pressors  - palliative medicine will continue to follow      " Philippe notified of the above goals of care conversation.

## 2020-09-24 NOTE — ASSESSMENT & PLAN NOTE
Mr Reyes is an 86 yo male with PMHx of HFrEF (EF 30-35%) who preseneted in septic shock and now has increased need for pressors. Hx of increasing dementia over the past year, and after his last admission for bacteremia, patient was admitted to nursing home    Plan:  - patient's wife Katia has been at bedside and has been engaged in goals of care discussions  - palliative consult  - decision has been made to not escalate pressors, not start dialysis, not perform chest compressions, and not intubate  - allow time for family to arrive and visit before withdrawing to comfort care later this evening  - will continue dialogue with patient's family

## 2020-09-24 NOTE — PLAN OF CARE
CMICU DAILY GOALS       A: Awake    RASS: Goal -    Actual -     Restraint necessity: Clinical Justification: Removing medical devices  B: Breath   SBT: Not intubated   C: Coordinate A & B, analgesics/sedatives   Pain: managed    SAT: Not intubated  D: Delirium   CAM-ICU: Overall CAM-ICU: Positive  E: Early(intubated/ Progressive (non-intubated) Mobility   MOVE Screen: Pass   Activity: Activity Management: patient unable to perform activities  FAS: Feeding/Nutrition   Diet order: Diet/Nutrition Received: NPO,   Fluid restriction:    T: Thrombus   DVT prophylaxis: VTE Required Core Measure: Pharmacological prophylaxis initiated/maintained  H: HOB Elevation   Head of Bed (HOB): HOB at 20-30 degrees  U: Ulcer Prophylaxis   GI: yes  G: Glucose control   managed    S: Skin   Bundle compliance: yes   Bathing/Skin Care: bath, chlorhexidine, incontinence care, linen changed, bath, partial Date: [unfilled]  B: Bowel Function   no issues   I: Indwelling Catheters   Hernandez necessity:      Urethral Catheter 09/22/20 1153 Temperature probe-Reason for Continuing Urinary Catheterization: Critically ill in ICU requiring intensive monitoring   CVC necessity: Yes   IPAD offered: Not appropriate  D: De-escalation Antibx   No  Plan for the day   Wean pressors as tolerated, cont keppra and abx  Family/Goals of care/Code Status   Code Status: DNR     No acute events throughout day, VS and assessment per flow sheet, patient progressing towards goals as tolerated, plan of care reviewed with Eric Delvalle and family, all concerns addressed, will continue to monitor.

## 2020-09-24 NOTE — ASSESSMENT & PLAN NOTE
Patient with witnessed seizure-like activity and confusion/incoherence since    Plan:  - CTH negative  - Could also be metabolic encephalopathy from sepsis   - Keppra 500 mg q 12hr

## 2020-09-24 NOTE — PLAN OF CARE
CMICU DAILY GOALS       A: Awake    RASS: Goal - RASS Goal: 0-->alert and calm  Actual - RASS (Basurto Agitation-Sedation Scale): -3-->moderate sedation   Restraint necessity: Clinical Justification: Removing medical devices, Treatment Interference  B: Breath   SBT: Not intubated   C: Coordinate A & B, analgesics/sedatives   Pain: managed    SAT: Not intubated  D: Delirium   CAM-ICU: Overall CAM-ICU: Positive  E: Early(intubated/ Progressive (non-intubated) Mobility   MOVE Screen: Fail   Activity: Activity Management: patient unable to perform activities  FAS: Feeding/Nutrition   Diet order: Diet/Nutrition Received: NPO,   Fluid restriction:    T: Thrombus   DVT prophylaxis: VTE Required Core Measure: (SCDs) Sequential compression device initiated/maintained  H: HOB Elevation   Head of Bed (HOB): HOB at 30 degrees  U: Ulcer Prophylaxis   GI: no  G: Glucose control   managed    S: Skin   Bundle compliance: yes   Bathing/Skin Care: bath, complete, bath, chlorhexidine, back care, incontinence care, linen changed Date: 9/24 @ 0600  B: Bowel Function   no issues   I: Indwelling Catheters   Hernandez necessity:      Urethral Catheter 09/22/20 1153 Temperature probe-Reason for Continuing Urinary Catheterization: Critically ill in ICU requiring intensive monitoring   CVC necessity: Yes   IPAD offered: Not appropriate  D: De-escalation Antibx   Yes  Plan for the day   No acute events throughout day, VS and assessment per flow sheet. Pt started on morphine gtt for comfort with understanding that family will be ready to transition to comfort care over night or in the morning. Levo and epi to remain at current dose and not titrated up. Plan of care reviewed with Eric Delvalle and family: wife, brother-in-law, and daughter, all questions and concerns addressed, will continue to monitor.   Family/Goals of care/Code Status   Code Status: DNR

## 2020-09-24 NOTE — NURSING
Discussed with pharmacist about patient's dosing weight of levo and epi are ordered 94.3 kg and patient's weight is only 57.1 kg. Pumps are currently infusing with ordered dosing weight of 94.3 kg.   Since patient will likely be transitioning to comfort care, we will wait until rounds to discuss changing dosing weight.

## 2020-09-24 NOTE — HPI
"  HPI obtained from chart review see H and P written per primary team 9/22/2020    "Mr. Delvalle is an 85 year old year old male with a PMH of HFrEF (EF 30-35%) and recent MSSA bacteremia (with MRSA in the urine) who presents from [facility] for altered mental status. Per his wife Katia, patient was fairly functional with his ADLs prior to this year. In January, he was hospitalized for pneumonia and never fully recovered. Recently he was hospitalized in August for a CHF exacerbation, then re-admitted to [facility] 8/31-9/11 for bacteremia. He was ultimately discharged to SNF with ancef through a midline for a 14 day course of antibiotics. Today, the SNF noted him to have seizure like activity and he was brought to the ED. He was noted to be quite obtunded and had further seizure like activity in the ED. An a line was started and he was started on pressors with norepi at 0.5 and epi at 0.02, but the decision was made not to intubate as per his wife's wishes.      His wife Katia expressed very good understanding of the situation and understands that he is dying. She stated that his body has been through a lot and she does not want to prolong his suffering and she does not want any more invasive procedures done to his body. She does not want him to be put on a ventilator, and does not want him to receive chest compressions."      Palliative medicine consulted for goals of care   "

## 2020-09-24 NOTE — CONSULTS
"[facility]  Palliative Medicine  Consult Note    Patient Name: Eric Miranda  MRN: [id number]  Admission Date: 9/22/2020  Hospital Length of Stay: 2 days  Code Status: DNR   Attending Provider: Efraín Paez MD  Consulting Provider: DERICK Diallo  Primary Care Physician: No primary care provider on file.  Principal Problem:Septic shock    Patient information was obtained from spouse/SO, past medical records and ER records.      Consults  Assessment/Plan:     Palliative care encounter  Palliative care consulted for goals of care.     Impression:  Mr. Miranda is a 86 yo gentleman admitted to critical care with septic shock.  He is awake, not oriented, appears comfortable - occasionally has moaning and facial grimacing.  Currently receiving antibiotics,  pressor support and morphine drip.  Supplemental oxygen per nasal cannula. Patient not amenable to intubation. No acute distress noted     Advance Care Planning     No advanced care planning documents received  - wife  Katia Miranda [phone] is next of kin for medical decision making   -DNR per primary team - family in agreement      Goals of Care   - palliative medicine met with patient at bedside.  Palliative medicine introduced.   - Mr. Miranda unable to participate in conversation  - Mrs. Miranda appears to have good understanding of current clinical conditon.   - Mr. Miranda, wife states her daughter is coming in from [place], on the bus, and would like daughter to be involved in discussions too.   - Mrs. Miranda states quality of life has been poor since he left the other hospital and had "gone down" more since being in the nursing home.  Her wish is he not suffer.   - She is opposed to life support - intubation   - Mrs. Mirandas states her understanding is the  pressor medications are life support for him.  She reports he would not choose to have prolonged life support.    - She also states if the patient has significant change she does " "not want the doctors to had more medications or increase his current doses.    - She remains hopeful her daughter will arrive in time to see him and talk with the doctors,  - Mrs. Delvalle states he would choose to have a natural death and that is what she want too.   - It also appears wife  is amenable to withdrawing pressors after her daughter arrives.     Plan/Recommendations  - continue current plan of care  - anticipate withdrawal of life support - pressors  - palliative medicine will continue to follow     Dr. Paez notified of the above goals of care conversation.           Thank you for your consult. I will follow-up with patient. Please contact us if you have any additional questions.    Subjective:     HPI:     HPI obtained from chart review see H and P written per primary team 9/22/2020    "Mr. Delvalle is an 85 year old year old male with a PMH of HFrEF (EF 30-35%) and recent MSSA bacteremia (with MRSA in the urine) who presents from [facility] for altered mental status. Per his wife Katia, patient was fairly functional with his ADLs prior to this year. In January, he was hospitalized for pneumonia and never fully recovered. Recently he was hospitalized in August for a CHF exacerbation, then re-admitted to [facility] 8/31-9/11 for bacteremia. He was ultimately discharged to SNF with ancef through a midline for a 14 day course of antibiotics. Today, the SNF noted him to have seizure like activity and he was brought to the ED. He was noted to be quite obtunded and had further seizure like activity in the ED. An a line was started and he was started on pressors with norepi at 0.5 and epi at 0.02, but the decision was made not to intubate as per his wife's wishes.      His wife Katia expressed very good understanding of the situation and understands that he is dying. She stated that his body has been through a lot and she does not want to prolong his suffering and she does not want any more " "invasive procedures done to his body. She does not want him to be put on a ventilator, and does not want him to receive chest compressions."      Palliative medicine consulted for goals of care     Hospital Course:  No notes on file    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    DM type 2 (diabetes mellitus, type 2) 1/28/2014    Edema 1/28/2014    HTN (hypertension) 1/28/2014    Hyperlipidemia 1/28/2014       Past Surgical History:   Procedure Laterality Date    CATARACT EXTRACTION W/  INTRAOCULAR LENS IMPLANT  2012    left eye       Review of patient's allergies indicates:  No Known Allergies    Medications:  Continuous Infusions:   epinephrine 0.08 mcg/kg/min (09/24/20 1400)    morphine 3 mg/hr (09/24/20 1400)    norepinephrine bitartrate-D5W 0.74 mcg/kg/min (09/24/20 1400)     Scheduled Meds:   ceFEPime (MAXIPIME) IVPB  2 g Intravenous Q24H    haloperidol lactate  2 mg Intramuscular Once    levetiracetam IVPB  500 mg Intravenous Q12H     PRN Meds:dextrose 50%, glucagon (human recombinant), insulin aspart U-100, Pharmacy to dose Vancomycin consult **AND** vancomycin - pharmacy to dose    Family History     Problem Relation (Age of Onset)    Cancer Brother, Sister        Tobacco Use    Smoking status: Former Smoker     Types: Cigarettes   Substance and Sexual Activity    Alcohol use: No    Drug use: No    Sexual activity: Not on file       Review of Systems   Unable to perform ROS: Patient nonverbal     Objective:     Vital Signs (Most Recent):  Temp: 98.6 °F (37 °C) (09/24/20 1400)  Pulse: 91 (09/24/20 1400)  Resp: (!) 25 (09/24/20 1400)  BP: (!) 97/55 (09/23/20 0400)  SpO2: 96 % (09/24/20 1400) Vital Signs (24h Range):  Temp:  [96.1 °F (35.6 °C)-100 °F (37.8 °C)] 98.6 " °F (37 °C)  Pulse:  [70-99] 91  Resp:  [11-28] 25  SpO2:  [91 %-98 %] 96 %  Arterial Line BP: ()/(45-60) 98/50     Weight: 57.1 kg (125 lb 14.1 oz)  Body mass index is 19.14 kg/m².    Physical Exam  Vitals signs and nursing note reviewed.   Constitutional:       Comments: Disoriented, poor enunciation,    HENT:      Head: Normocephalic.   Neck:      Musculoskeletal: Normal range of motion.   Cardiovascular:      Rate and Rhythm: Normal rate and regular rhythm.   Pulmonary:      Comments: Tachypnea   Abdominal:      General: Abdomen is flat.   Skin:     General: Skin is warm and dry.   Neurological:      Mental Status: He is alert. He is disoriented.         Review of Symptoms    Symptom Assessment (ESAS 0-10 Scale)  Pain:  0  Dyspnea:  0  Anxiety:  0  Nausea:  0  Depression:  0  Anorexia:  0  Fatigue:  0  Insomnia:  0  Restlessness:  0  Agitation:  0         Comments:  Patient is disoriented and has poor speech quality unable to complete ESAS, no facial grimacing or moaning. Pulse ox 91 % tachypnea,  Patient and family not amenable to intubation          Psychosocial/Cultural:  60 plus years 4 children, 7 grandchildren, retired, enjoys spending time with family and traveling to [facility] to visit with relatives     Spiritual:  F - Melanie and Belief:  Maria Elena   A - Address in Care:   services offered       Advance Care Planning   Advance Directives:   Living Will: No    LaPOST: No    Do Not Resuscitate Status: Yes    Medical Power of : No (Legal next of kin for medical decision making is wife )      Decision Making:  Family answered questions         Significant Labs: All pertinent labs within the past 24 hours have been reviewed.  CBC:   Recent Labs   Lab 09/24/20 0307   WBC 7.85   HGB 8.3*   HCT 26.3*   *        BMP:  Recent Labs   Lab 09/24/20 0307         K 3.6      CO2 18*   BUN 33*   CREATININE 2.4*   CALCIUM 8.5*   MG 2.0     LFT:  Lab Results    Component Value Date    AST 17 09/24/2020    ALKPHOS 74 09/24/2020    BILITOT 1.0 09/24/2020     Albumin:   Albumin   Date Value Ref Range Status   09/24/2020 1.7 (L) 3.5 - 5.2 g/dL Final     Protein:   Total Protein   Date Value Ref Range Status   09/24/2020 6.5 6.0 - 8.4 g/dL Final     Lactic acid:   Lab Results   Component Value Date    LACTATE 3.7 (HH) 09/23/2020    LACTATE 0.8 09/22/2020       Significant Imaging: I have reviewed all pertinent imaging results/findings within the past 24 hours.      > 50% of 70  min visit spent in chart review, face to face discussion of goals of care,  symptom assessment, coordination of care and emotional support.    Additional 20 mins spent in advanced care planning     BROCK Christina, MARY, ACNS-BC  Palliative Medicine  [facility]

## 2020-09-24 NOTE — SUBJECTIVE & OBJECTIVE
Interval History/Significant Events: Seen and examined this morning. No acute events overnight. Wife at bedside. Pressor requirements have gone up slightly. Patient not as agitated as when examined the morning previous. Ongoing goals of care talks with Dr Paez have yielded decision to not escalate pressor requirements, and to allow time for family to visit before withdrawing supportive care. Will continue dialog with Mr Reyes's family and respect their wishes. Will continue to monitor.    Review of Systems   Unable to perform ROS: Acuity of condition     Objective:     Vital Signs (Most Recent):  Temp: 98.6 °F (37 °C) (09/24/20 1400)  Pulse: 91 (09/24/20 1400)  Resp: (!) 25 (09/24/20 1400)  BP: (!) 97/55 (09/23/20 0400)  SpO2: 96 % (09/24/20 1400) Vital Signs (24h Range):  Temp:  [96.1 °F (35.6 °C)-100 °F (37.8 °C)] 98.6 °F (37 °C)  Pulse:  [70-99] 91  Resp:  [11-28] 25  SpO2:  [91 %-98 %] 96 %  Arterial Line BP: ()/(45-60) 98/50   Weight: 57.1 kg (125 lb 14.1 oz)  Body mass index is 19.14 kg/m².      Intake/Output Summary (Last 24 hours) at 9/24/2020 1427  Last data filed at 9/24/2020 1400  Gross per 24 hour   Intake 2000.44 ml   Output 365 ml   Net 1635.44 ml       Physical Exam  Constitutional:       Appearance: He is ill-appearing.   HENT:      Head: Normocephalic and atraumatic.   Cardiovascular:      Rate and Rhythm: Regular rhythm. Bradycardia present.   Pulmonary:      Effort: Respiratory distress present.      Breath sounds: Rhonchi present.   Abdominal:      General: Bowel sounds are normal. There is no distension.   Musculoskeletal:         General: No deformity.      Right lower leg: No edema.      Left lower leg: No edema.   Skin:     General: Skin is warm and dry.      Findings: No erythema or rash.   Neurological:      Comments: Obtunded, withdraws from pain   Psychiatric:      Comments: Unable to assess          Lines/Drains/Airways     Central Venous Catheter Line            Trialysis  (Dialysis) Catheter 09/23/20 1314 right femoral 1 day          Drain                 Urethral Catheter 09/22/20 1153 Temperature probe 2 days          Arterial Line            Arterial Line 09/22/20 1130 Right Radial 2 days          Peripheral Intravenous Line                 Peripheral IV - Single Lumen 09/24/20 1210 20 G;1 3/4 in Right Forearm less than 1 day              Significant Labs:    CBC/Anemia Profile:  Recent Labs   Lab 09/23/20 0405 09/24/20 0307   WBC 12.39 7.85   HGB 9.9* 8.3*   HCT 31.5* 26.3*   * 265   * 104*   RDW 14.7* 14.7*        Chemistries:  Recent Labs   Lab 09/22/20 2106 09/23/20 0405 09/23/20 2008 09/24/20 0307    139  --  139   K 2.4* 3.1* 3.2* 3.6    110  --  110   CO2 18* 17*  --  18*   BUN 33* 32*  --  33*   CREATININE 2.0* 2.3*  --  2.4*   CALCIUM 8.7 8.8  --  8.5*   ALBUMIN  --  1.8*  --  1.7*   PROT  --  7.0  --  6.5   BILITOT  --  0.8  --  1.0   ALKPHOS  --  87  --  74   ALT  --  <5*  --  <5*   AST  --  17  --  17   MG  --  2.1  --  2.0       Blood Culture: No results for input(s): LABBLOO in the last 48 hours.  BMP:   Recent Labs   Lab 09/24/20 0307         K 3.6      CO2 18*   BUN 33*   CREATININE 2.4*   CALCIUM 8.5*   MG 2.0     CMP:   Recent Labs   Lab 09/22/20 2106 09/23/20 0405 09/23/20 2008 09/24/20 0307    139  --  139   K 2.4* 3.1* 3.2* 3.6    110  --  110   CO2 18* 17*  --  18*   * 229*  --  103   BUN 33* 32*  --  33*   CREATININE 2.0* 2.3*  --  2.4*   CALCIUM 8.7 8.8  --  8.5*   PROT  --  7.0  --  6.5   ALBUMIN  --  1.8*  --  1.7*   BILITOT  --  0.8  --  1.0   ALKPHOS  --  87  --  74   AST  --  17  --  17   ALT  --  <5*  --  <5*   ANIONGAP 15 12  --  11   EGFRNONAA 29.8* 25.1*  --  23.9*     All pertinent labs within the past 24 hours have been reviewed.    Significant Imaging:  No significant imaging in last 24 hours.

## 2020-09-24 NOTE — ASSESSMENT & PLAN NOTE
Presented with Cr 2.0 from baseline 1.0    Plan:  - Cr 2.4 from BL 1.0, prerenal v cardiorenal v ATN  - UO decreased to 5-20 cc/hour

## 2020-09-24 NOTE — CONSULTS
Palliative Care Acknowledgement of Consult - .date 9/24/2020    Consult received. Palliative Care Provider:BROCK Christina, APRN, ACNS-BC has reviewed chart and discussed patient with primary team. Focus of consult is goals of care.   Full consult to follow.   Thank you for allowing us to be a part of the care of this patient.

## 2020-09-25 PROBLEM — Z51.5 COMFORT MEASURES ONLY STATUS: Status: ACTIVE | Noted: 2020-01-01

## 2020-09-25 NOTE — NURSING
Pt transitioned to comfort care at this time. Dr. Paez at bedside. All pressors turned off. Pt on morphine drip. Family at bedside.  called and at bedside to provide support to the family. Pt in no distress at this time.

## 2020-09-25 NOTE — HOSPITAL COURSE
Patient was admitted to MICU due to his critical illness. Was treated with antibiotics and started on pressors for his hypotension. Patient's pressor requirement went up, and after speaking with wife, central line was placed. Pressor requirement continued to escalate during ongoing discussions with wife and family. Patient's wife Mrs Montalvo understood that pressors were supporting his life, and further escalation of care wouldn't be of any certain benefit to Mr Reyes's quality of life, and would almost certainly end up with repetitive ICU stays without improvement. Time was given for family to travel and arrive at bedside, and the decision was made to convert to comfort care and withdrawal life-support.

## 2020-09-25 NOTE — PLAN OF CARE
Critical Care Medicine                                                              Death Note      Called to bedside by patient's nurse. Nursing supervisor notified. Family at bedside.  has been called and is also at bedside.  Patient is not responding to verbal or tactile stimuli. Patient does not have a papillary or corneal reflex. His pupils are fixed and dilated. No heart or breath sounds on auscultation. No respirations. No palpable pulses.     Time of death 1715     Cause of Death: Multiorgan system failure, 2/2 to septic shock    Dr. Kai Paez MD  Critical Care Medicine  [email or web address]

## 2020-09-25 NOTE — SUBJECTIVE & OBJECTIVE
Interval History/Significant Events: Seen and examined this morning. Maxed out on morphine drip but appears comfortable. Family at bedside today with plans to withdrawal care when they feel they are ready. Will continue to monitor.    Review of Systems   Unable to perform ROS: Acuity of condition     Objective:     Vital Signs (Most Recent):  Temp: 97.5 °F (36.4 °C) (09/25/20 1400)  Pulse: 69 (09/25/20 1400)  Resp: (!) 4 (09/25/20 1400)  BP: (!) 97/55 (09/23/20 0400)  SpO2: (!) 92 % (09/25/20 1400) Vital Signs (24h Range):  Temp:  [96.8 °F (36 °C)-98.8 °F (37.1 °C)] 97.5 °F (36.4 °C)  Pulse:  [69-94] 69  Resp:  [4-29] 4  SpO2:  [86 %-99 %] 92 %  Arterial Line BP: ()/(19-59) 31/19   Weight: 57.1 kg (125 lb 14.1 oz)  Body mass index is 19.14 kg/m².      Intake/Output Summary (Last 24 hours) at 9/25/2020 1446  Last data filed at 9/25/2020 1400  Gross per 24 hour   Intake 1279.71 ml   Output 365 ml   Net 914.71 ml       Physical Exam  Vitals reviewed: forwent due to comfort care.         Vents:     Lines/Drains/Airways     Central Venous Catheter Line            Trialysis (Dialysis) Catheter 09/23/20 1314 right femoral 2 days          Arterial Line            Arterial Line 09/22/20 1130 Right Radial 3 days          Peripheral Intravenous Line                 Peripheral IV - Single Lumen 09/24/20 1210 20 G;1 3/4 in Right Forearm 1 day              Significant Labs:  None    Significant Imaging:  None

## 2020-09-25 NOTE — ASSESSMENT & PLAN NOTE
Mr Reyes is an 84 yo male with PMHx of HFrEF (EF 30-35%) who preseneted in septic shock and now has increased need for pressors. Hx of increasing dementia over the past year, and after his last admission for bacteremia, patient was admitted to nursing home    Plan:  - patient's wife Katia has been at bedside and has been engaged in goals of care discussions  - palliative consult  - decision has been made to not escalate pressors, not start dialysis, not perform chest compressions, and not intubate  - transitioned to comfort care 9/25 at noon, will provide comfort and family support  - will continue dialogue with patient's family

## 2020-09-25 NOTE — ASSESSMENT & PLAN NOTE
Mr Reyes is an 86 yo male with PMHx of HFrEF (EF 30-35%) who preseneted in septic shock and now has increased need for pressors. Hx of increasing dementia over the past year, and after his last admission for bacteremia, patient was admitted to nursing home    Plan:  - patient's wife Katia has been at bedside and has been engaged in goals of care discussions  - palliative consult  - decision has been made to not escalate pressors, not start dialysis, not perform chest compressions, and not intubate  - transitioned to comfort care 9/25 at noon, will provide comfort and family support  - patient peacefully passed away at 1715 9/25

## 2020-09-25 NOTE — PLAN OF CARE
CMICU DAILY GOALS       A: Awake    RASS: Goal - RASS Goal: -2-->light sedation  Actual - RASS (Basurto Agitation-Sedation Scale): -2-->light sedation   Restraint necessity: Clinical Justification: Removing medical devices, Treatment Interference  B: Breath   SBT: Not intubated   C: Coordinate A & B, analgesics/sedatives   Pain: managed    SAT: Not intubated  D: Delirium   CAM-ICU: Overall CAM-ICU: Positive  E: Early(intubated/ Progressive (non-intubated) Mobility   MOVE Screen: Fail   Activity: Activity Management: patient unable to perform activities  FAS: Feeding/Nutrition   Diet order: Diet/Nutrition Received: NPO,   Fluid restriction:    T: Thrombus   DVT prophylaxis: VTE Required Core Measure: (SCDs) Sequential compression device initiated/maintained  H: HOB Elevation   Head of Bed (HOB): HOB at 30-45 degrees  U: Ulcer Prophylaxis   GI: no  G: Glucose control   managed    S: Skin   Bundle compliance: yes   Bathing/Skin Care: bath, complete, bath, chlorhexidine, back care, incontinence care, linen changed Date: 9/24    B: Bowel Function   no issues   I: Indwelling Catheters   Hernandez necessity:      Urethral Catheter 09/22/20 1153 Temperature probe-Reason for Continuing Urinary Catheterization: Critically ill in ICU requiring intensive monitoring   CVC necessity: Yes   IPAD offered: Not appropriate  D: De-escalation Antibx     Plan for the day   Transition to comfort care when family arrives in AM, MD spoke with family before shift change last night, no escalation of care over night, did not titrate pressors    Family/Goals of care/Code Status   Code Status: DNR      VS and assessment per flow sheet, patient progressing towards goals as tolerated, plan of care reviewed with Eric Delvalle and family, all concerns addressed, will continue to monitor.

## 2020-09-25 NOTE — DISCHARGE SUMMARY
[facility]  Critical Care Medicine  Discharge Summary - Death      Patient Name: Eric Delvalle  MRN: [id number]  Admission Date: 9/22/2020  Hospital Length of Stay: 3 days  Discharge Date and Time: 9/25/2020   Date of Death: 9/25/20 @ 1715  Attending Physician: Efraín Paez MD   Discharging Provider: Carlos Tomlin MD  Primary Care Provider: No primary care provider on file.  Reason for Admission: AMS    HPI:   Mr. Delvalle is an 85 year old year old male with a PMH of HFrEF (EF 30-35%) and recent MSSA bacteremia (with MRSA in the urine) who presents from [facility] for altered mental status. Per his wife Katia, patient was fairly functional with his ADLs prior to this year. In January, he was hospitalized for pneumonia and never fully recovered. Recently he was hospitalized in August for a CHF exacerbation, then re-admitted to [facility] 8/31-9/11 for bacteremia. He was ultimately discharged to SNF with ancef through a midline for a 14 day course of antibiotics. Today, the SNF noted him to have seizure like activity and he was brought to the ED. He was noted to be quite obtunded and had further seizure like activity in the ED. An a line was started and he was started on pressors with norepi at 0.5 and epi at 0.02, but the decision was made not to intubate as per his wife's wishes.     His wife Katia expressed very good understanding of the situation and understands that he is dying. She stated that his body has been through a lot and she does not want to prolong his suffering and she does not want any more invasive procedures done to his body. She does not want him to be put on a ventilator, and does not want him to receive chest compressions.     She is calling his brother and her children to see if anyone would like to come to bedside. If he further decompensates prior to this, she does not want any escalation of care. Once other family members come, she would like to  transition goals to comfort care.     * No surgery found *    Indwelling Lines/Drains at Time of Discharge:   Lines/Drains/Airways     Central Venous Catheter Line            Trialysis (Dialysis) Catheter 09/23/20 1314 right femoral 2 days          Arterial Line            Arterial Line 09/22/20 1130 Right Radial 3 days              Hospital Course:   Patient was admitted to MICU due to his critical illness. Was treated with antibiotics and started on pressors for his hypotension. Patient's pressor requirement went up, and after speaking with wife, central line was placed. Pressor requirement continued to escalate during ongoing discussions with wife and family. Patient's wife Mrs Montalvo understood that pressors were supporting his life, and further escalation of care wouldn't be of any certain benefit to Mr Reyes's quality of life, and would almost certainly end up with repetitive ICU stays without improvement. Time was given for family to travel and arrive at bedside, and the decision was made to convert to comfort care and withdrawal life-support.    Consults (From admission, onward)        Status Ordering Provider     Inpatient consult to Midline team  Once     Provider:  (Not yet assigned)    Completed MELANY JUNE     Inpatient consult to Palliative Care  Once     Provider:  (Not yet assigned)    Completed TYRON HAYWOOD [facility]        Significant Labs:  none    Significant Imaging:  none    Pending Diagnostic Studies:     None        Final Active Diagnoses:    Diagnosis Date Noted POA    PRINCIPAL PROBLEM:  Septic shock [A41.9, R65.21] 09/22/2020 Unknown    Comfort measures only status [Z51.5] 09/25/2020 Not Applicable    Goals of care, counseling/discussion [Z71.89] 09/24/2020 Not Applicable    Altered mental state [R41.82] 09/23/2020 Yes    Palliative care encounter [Z51.5] 09/24/2020 Not Applicable    Advanced care planning/counseling discussion [Z71.89]  Not Applicable    VIRGINIE (acute kidney  injury) [N17.9] 09/23/2020 Yes    Seizures complicating infection [R56.9] 09/22/2020 Yes    Chronic systolic heart failure [I50.22] 09/22/2020 Unknown    Bradycardia [R00.1] 09/22/2020 Unknown      Problems Resolved During this Admission:     Neuro  Altered mental state  Patient with witnessed seizure-like activity and confusion/incoherence since    Plan:  - CTH negative  - Could also be metabolic encephalopathy from sepsis   - Keppra 500 mg q 12hr    Cardiac/Vascular  Bradycardia  Sinus bradycardia with frequent PVCs, was not bradycardic on previous admission    Plan:  - Replete K >4 and Mg >2  - Continue inotropic support for now (epi)    Chronic systolic heart failure  Hx of HFrEF, EF 30-35% at recent admission to [facility]. Possible component of cardiogenic shock, but extremities are warm. Forgoing central line for now given wife's preference     Plan:  - Holding goal directed therapy for now    Renal/  VIRGINIE (acute kidney injury)  Presented with Cr 2.0 from baseline 1.0    Plan:  - Cr 2.4 from BL 1.0, prerenal v cardiorenal v ATN  - UO decreased to 5-20 cc/hour    ID  * Septic shock  Presented with profound hypotension, quickly titrated with norepi and epi. Suspect sepsis given known bacteremia on Ancef OP, multifocal cavitary pneumonia (sputum never obtained). Has indwelling midline from previous admission. Had an MRSA UTI at previous admission.    Plan:  - Patient received vanc/cefepime in the ED  - Continue pressors for now, discussions ongoing  - see goals of care discussion  - TSH WNL  - Possible component of cardiogenic shock given hx of HFrEF and bradycardia    Other  Goals of care, counseling/discussion  Mr Reyes is an 84 yo male with PMHx of HFrEF (EF 30-35%) who preseneted in septic shock and now has increased need for pressors. Hx of increasing dementia over the past year, and after his last admission for bacteremia, patient was admitted to nursing home    Plan:  - patient's wife Katia has been at  bedside and has been engaged in goals of care discussions  - palliative consult  - decision has been made to not escalate pressors, not start dialysis, not perform chest compressions, and not intubate  - transitioned to comfort care  at noon, will provide comfort and family support  - patient peacefully passed away at 1715       Discharged Condition:       Patient Instructions:   No discharge procedures on file.  Medications:  None (patient  at medical facility)     Carlos Tomlin MD, PGY-1  Critical Care Medicine  [facility]

## 2020-09-25 NOTE — PROGRESS NOTES
[facility]  Critical Care Medicine  Progress Note    Patient Name: Eric Delvalle  MRN: [id number]  Admission Date: 9/22/2020  Hospital Length of Stay: 3 days  Code Status: DNR  Attending Provider: Efraín Paez MD  Primary Care Provider: No primary care provider on file.   Principal Problem: Septic shock    Subjective:     HPI:  Mr. Delvalle is an 85 year old year old male with a PMH of HFrEF (EF 30-35%) and recent MSSA bacteremia (with MRSA in the urine) who presents from [facility] for altered mental status. Per his wife Katia, patient was fairly functional with his ADLs prior to this year. In January, he was hospitalized for pneumonia and never fully recovered. Recently he was hospitalized in August for a CHF exacerbation, then re-admitted to [facility] 8/31-9/11 for bacteremia. He was ultimately discharged to SNF with ancef through a midline for a 14 day course of antibiotics. Today, the SNF noted him to have seizure like activity and he was brought to the ED. He was noted to be quite obtunded and had further seizure like activity in the ED. An a line was started and he was started on pressors with norepi at 0.5 and epi at 0.02, but the decision was made not to intubate as per his wife's wishes.     His wife Katia expressed very good understanding of the situation and understands that he is dying. She stated that his body has been through a lot and she does not want to prolong his suffering and she does not want any more invasive procedures done to his body. She does not want him to be put on a ventilator, and does not want him to receive chest compressions.     She is calling his brother and her children to see if anyone would like to come to bedside. If he further decompensates prior to this, she does not want any escalation of care. Once other family members come, she would like to transition goals to comfort care.     Hospital/ICU Course:  Patient was admitted to MICU due to  his critical illness. Was treated with antibiotics and started on pressors for his hypotension. Patient's pressor requirement went up, and after speaking with wife, central line was placed. Pressor requirement continued to escalate during ongoing discussions with wife and family. Patient's wife Mrs Montalvo understood that pressors were supporting his life, and further escalation of care wouldn't be of any certain benefit to Mr Reyes's quality of life, and would almost certainly end up with repetitive ICU stays without improvement. Time was given for family to travel and arrive at bedside, and the decision was made to convert to comfort care and withdrawal life-support.    Interval History/Significant Events: Seen and examined this morning. Maxed out on morphine drip but appears comfortable. Family at bedside today with plans to withdrawal care when they feel they are ready. Will continue to monitor.    Review of Systems   Unable to perform ROS: Acuity of condition     Objective:     Vital Signs (Most Recent):  Temp: 97.5 °F (36.4 °C) (09/25/20 1400)  Pulse: 69 (09/25/20 1400)  Resp: (!) 4 (09/25/20 1400)  BP: (!) 97/55 (09/23/20 0400)  SpO2: (!) 92 % (09/25/20 1400) Vital Signs (24h Range):  Temp:  [96.8 °F (36 °C)-98.8 °F (37.1 °C)] 97.5 °F (36.4 °C)  Pulse:  [69-94] 69  Resp:  [4-29] 4  SpO2:  [86 %-99 %] 92 %  Arterial Line BP: ()/(19-59) 31/19   Weight: 57.1 kg (125 lb 14.1 oz)  Body mass index is 19.14 kg/m².      Intake/Output Summary (Last 24 hours) at 9/25/2020 1446  Last data filed at 9/25/2020 1400  Gross per 24 hour   Intake 1279.71 ml   Output 365 ml   Net 914.71 ml       Physical Exam  Vitals reviewed: forwent due to comfort care.         Vents:     Lines/Drains/Airways     Central Venous Catheter Line            Trialysis (Dialysis) Catheter 09/23/20 1314 right femoral 2 days          Arterial Line            Arterial Line 09/22/20 1130 Right Radial 3 days          Peripheral Intravenous Line                  Peripheral IV - Single Lumen 09/24/20 1210 20 G;1 3/4 in Right Forearm 1 day              Significant Labs:  None    Significant Imaging:  None      ABG  Recent Labs   Lab 09/23/20  1235   PH 7.407   PO2 93   PCO2 22.3*   HCO3 14.1*   BE -11     Assessment/Plan:     Neuro  Altered mental state  Patient with witnessed seizure-like activity and confusion/incoherence since    Plan:  - CTH negative  - Could also be metabolic encephalopathy from sepsis   - Keppra 500 mg q 12hr    Cardiac/Vascular  Bradycardia  Sinus bradycardia with frequent PVCs, was not bradycardic on previous admission    Plan:  - Replete K >4 and Mg >2  - Continue inotropic support for now (epi)    Chronic systolic heart failure  Hx of HFrEF, EF 30-35% at recent admission to [facility]. Possible component of cardiogenic shock, but extremities are warm. Forgoing central line for now given wife's preference     Plan:  - Holding goal directed therapy for now    Renal/  VIRGINIE (acute kidney injury)  Presented with Cr 2.0 from baseline 1.0    Plan:  - Cr 2.4 from BL 1.0, prerenal v cardiorenal v ATN  - UO decreased to 5-20 cc/hour    ID  * Septic shock  Presented with profound hypotension, quickly titrated with norepi and epi. Suspect sepsis given known bacteremia on Ancef OP, multifocal cavitary pneumonia (sputum never obtained). Has indwelling midline from previous admission. Had an MRSA UTI at previous admission.    Plan:  - Patient received vanc/cefepime in the ED  - Continue pressors for now, discussions ongoing  - see goals of care discussion  - TSH WNL  - Possible component of cardiogenic shock given hx of HFrEF and bradycardia    Other  Goals of care, counseling/discussion  Mr Reyes is an 86 yo male with PMHx of HFrEF (EF 30-35%) who preseneted in septic shock and now has increased need for pressors. Hx of increasing dementia over the past year, and after his last admission for bacteremia, patient was admitted to nursing home    Plan:  -  patient's wife Katia has been at bedside and has been engaged in goals of care discussions  - palliative consult  - decision has been made to not escalate pressors, not start dialysis, not perform chest compressions, and not intubate  - transitioned to comfort care 9/25 at noon, will provide comfort and family support  - will continue dialogue with patient's family      Critical secondary to Patient has a condition that poses threat to life and bodily function: Requiring pressors      Critical care was time spent personally by me on the following activities: development of treatment plan with patient or surrogate and bedside caregivers, discussions with consultants, evaluation of patient's response to treatment, examination of patient, ordering and performing treatments and interventions, ordering and review of laboratory studies, ordering and review of radiographic studies, pulse oximetry, re-evaluation of patient's condition. This critical care time did not overlap with that of any other provider or involve time for any procedures.     Carlos Tomlin MD, PGY-1  Critical Care Medicine  [facility]

## 2020-09-25 NOTE — NURSING
Patient asystole. MD notified and at the bedside. MD verified asystole at 1715.  notified. Family at the bedside. Patient's family verbalized acceptance. Will continue to monitor.

## 2020-09-25 NOTE — PROGRESS NOTES
Pharmacokinetic Assessment Follow Up: IV Vancomycin    Vancomycin Regimen Assessment & Plan:  - Vancomycin level drawn with morning labs today resulted as 16.8 mcg/mL, goal trough 15-20 mcg/mL.  - Patient with VIRGINIE, will continue pulse dosing.  - Administer vancomycin 750 mg IV x1 dose since level is <20. Draw vancomycin level with morning labs tomorrow. Will re-dose as needed depending on level and renal function.    Drug levels (last 3 results):  Recent Labs   Lab Result Units 09/23/20  0605 09/24/20  0307 09/25/20  0321   Vancomycin, Random ug/mL 4.6 22.7 16.8     Pharmacy will continue to follow and monitor vancomycin.    Please contact pharmacy at extension [phone] for questions regarding this assessment.    Thank you for the consult,   Kai Logan     Patient brief summary:  Eric Delvalle is a 85 y.o. male initiated on antimicrobial therapy with IV Vancomycin for treatment of bacteremia    The patient's current regimen is pulse dosing.    Drug Allergies:   Review of patient's allergies indicates:  No Known Allergies    Actual Body Weight:   57.1 kg    Renal Function:   Estimated Creatinine Clearance: 17.8 mL/min (A) (based on SCr of 2.5 mg/dL (H)).,     Dialysis Method (if applicable):  N/A

## 2020-09-25 NOTE — PROGRESS NOTES
Pharmacokinetic Sign-off: IV Vancomycin    Therapy with vancomycin complete and/or consult discontinued by provider.  Pharmacy will sign off, please re-consult as needed.    Navya Dalal, PharmD  EXT [phone]

## 2020-09-25 NOTE — CHAPLAIN
Provided end of life pastoral care with family and prayer. Family appreciated presence of . Provided family with grief packet and nurse with decedent care packet.

## 2020-09-25 NOTE — ASSESSMENT & PLAN NOTE
Hx of HFrEF, EF 30-35% at recent admission to [facility]. Possible component of cardiogenic shock, but extremities are warm. Forgoing central line for now given wife's preference     Plan:  - Holding goal directed therapy for now

## 2020-09-26 LAB
BACTERIA BLD CULT: ABNORMAL

## 2020-09-28 NOTE — PLAN OF CARE
Patient  .       20 0908   Final Note   Assessment Type Final Discharge Note   Anticipated Discharge Disposition    Hospital Follow Up  Appt(s) scheduled? No   Discharge plans and expectations educations in teach back method with documentation complete? No   Right Care Referral Info   Post Acute Recommendation No Care   Post-Acute Status   Post-Acute Authorization Other   Other Status No Post-Acute Service Needs       Isiah Corera MSN, RN-BC  Critical Care-   Ext. [phone]

## 2020-09-29 LAB
BACTERIA BLD CULT: ABNORMAL

## 2020-10-02 LAB
FUNGAL SUSC PNL ISLT: ABNORMAL
SPECIMEN SOURCE AND ORGANISM ID: ABNORMAL